# Patient Record
Sex: MALE | Race: WHITE | NOT HISPANIC OR LATINO | Employment: OTHER | URBAN - METROPOLITAN AREA
[De-identification: names, ages, dates, MRNs, and addresses within clinical notes are randomized per-mention and may not be internally consistent; named-entity substitution may affect disease eponyms.]

---

## 2017-01-16 DIAGNOSIS — E78.00 HIGH BLOOD CHOLESTEROL: ICD-10-CM

## 2017-01-16 DIAGNOSIS — I10 ESSENTIAL HYPERTENSION: ICD-10-CM

## 2017-01-16 DIAGNOSIS — I10 BENIGN ESSENTIAL HYPERTENSION: Primary | ICD-10-CM

## 2017-01-17 RX ORDER — LOSARTAN POTASSIUM 100 MG/1
100 TABLET ORAL DAILY
Qty: 90 TABLET | Refills: 1 | Status: SHIPPED | OUTPATIENT
Start: 2017-01-17 | End: 2017-06-19

## 2017-01-17 RX ORDER — ATORVASTATIN CALCIUM 10 MG/1
10 TABLET, FILM COATED ORAL EVERY OTHER DAY
Qty: 45 TABLET | Refills: 1 | Status: SHIPPED | OUTPATIENT
Start: 2017-01-17 | End: 2017-06-19

## 2017-01-17 RX ORDER — HYDROCHLOROTHIAZIDE 12.5 MG/1
12.5 TABLET ORAL DAILY
Qty: 90 TABLET | Refills: 1 | Status: SHIPPED | OUTPATIENT
Start: 2017-01-17 | End: 2017-06-19

## 2017-06-18 ASSESSMENT — ACTIVITIES OF DAILY LIVING (ADL)
ARE_THERE_CARBON_MONOXIDE_DETECTORS_IN_YOUR_HOME?: Y
DO_MEMBERS_OF_YOUR_HOUSEHOLD_USE_SUNSCREEN?: Y
DO_MEMBERS_OF_YOUR_HOUSEHOLD_WEAR_SEAT_BELTS?: Y
DO_MEMBERS_OF_YOUR_HOUSEHOLD_USE_SAFETY_HELMETS?: Y
ARE_THERE_FIREARMS_IN_YOUR_HOME?: Y
ARE_THERE_SMOKE_DETECTORS_IN_YOUR_HOME?: Y

## 2017-06-19 ENCOUNTER — OFFICE VISIT (OUTPATIENT)
Dept: INTERNAL MEDICINE | Facility: CLINIC | Age: 67
End: 2017-06-19

## 2017-06-19 VITALS
WEIGHT: 180.9 LBS | OXYGEN SATURATION: 99 % | DIASTOLIC BLOOD PRESSURE: 86 MMHG | SYSTOLIC BLOOD PRESSURE: 142 MMHG | BODY MASS INDEX: 26.34 KG/M2 | HEART RATE: 60 BPM | RESPIRATION RATE: 18 BRPM | TEMPERATURE: 97.8 F

## 2017-06-19 DIAGNOSIS — I10 BENIGN ESSENTIAL HYPERTENSION: ICD-10-CM

## 2017-06-19 DIAGNOSIS — Z12.5 SCREENING FOR PROSTATE CANCER: ICD-10-CM

## 2017-06-19 DIAGNOSIS — Z11.59 NEED FOR HEPATITIS C SCREENING TEST: Primary | ICD-10-CM

## 2017-06-19 DIAGNOSIS — E78.00 HIGH CHOLESTEROL: ICD-10-CM

## 2017-06-19 DIAGNOSIS — I10 ESSENTIAL HYPERTENSION: ICD-10-CM

## 2017-06-19 DIAGNOSIS — E78.00 HIGH BLOOD CHOLESTEROL: ICD-10-CM

## 2017-06-19 DIAGNOSIS — Z11.59 NEED FOR HEPATITIS C SCREENING TEST: ICD-10-CM

## 2017-06-19 LAB
ALBUMIN SERPL-MCNC: 3.8 G/DL (ref 3.4–5)
ALP SERPL-CCNC: 85 U/L (ref 40–150)
ALT SERPL W P-5'-P-CCNC: 32 U/L (ref 0–70)
ANION GAP SERPL CALCULATED.3IONS-SCNC: 7 MMOL/L (ref 3–14)
AST SERPL W P-5'-P-CCNC: 24 U/L (ref 0–45)
BASOPHILS # BLD AUTO: 0.1 10E9/L (ref 0–0.2)
BASOPHILS NFR BLD AUTO: 0.9 %
BILIRUB SERPL-MCNC: 1.1 MG/DL (ref 0.2–1.3)
BUN SERPL-MCNC: 20 MG/DL (ref 7–30)
CALCIUM SERPL-MCNC: 8.8 MG/DL (ref 8.5–10.1)
CHLORIDE SERPL-SCNC: 103 MMOL/L (ref 94–109)
CHOLEST SERPL-MCNC: 190 MG/DL
CO2 SERPL-SCNC: 27 MMOL/L (ref 20–32)
CREAT SERPL-MCNC: 0.79 MG/DL (ref 0.66–1.25)
DIFFERENTIAL METHOD BLD: NORMAL
EOSINOPHIL # BLD AUTO: 0.2 10E9/L (ref 0–0.7)
EOSINOPHIL NFR BLD AUTO: 3 %
ERYTHROCYTE [DISTWIDTH] IN BLOOD BY AUTOMATED COUNT: 13.4 % (ref 10–15)
GFR SERPL CREATININE-BSD FRML MDRD: NORMAL ML/MIN/1.7M2
GLUCOSE SERPL-MCNC: 85 MG/DL (ref 70–99)
HCT VFR BLD AUTO: 49 % (ref 40–53)
HDLC SERPL-MCNC: 52 MG/DL
HGB BLD-MCNC: 17 G/DL (ref 13.3–17.7)
IMM GRANULOCYTES # BLD: 0 10E9/L (ref 0–0.4)
IMM GRANULOCYTES NFR BLD: 0.4 %
LDLC SERPL CALC-MCNC: 90 MG/DL
LYMPHOCYTES # BLD AUTO: 1.8 10E9/L (ref 0.8–5.3)
LYMPHOCYTES NFR BLD AUTO: 26.1 %
MCH RBC QN AUTO: 31.8 PG (ref 26.5–33)
MCHC RBC AUTO-ENTMCNC: 34.7 G/DL (ref 31.5–36.5)
MCV RBC AUTO: 92 FL (ref 78–100)
MONOCYTES # BLD AUTO: 0.6 10E9/L (ref 0–1.3)
MONOCYTES NFR BLD AUTO: 8.4 %
NEUTROPHILS # BLD AUTO: 4.3 10E9/L (ref 1.6–8.3)
NEUTROPHILS NFR BLD AUTO: 61.2 %
NONHDLC SERPL-MCNC: 137 MG/DL
NRBC # BLD AUTO: 0 10*3/UL
NRBC BLD AUTO-RTO: 0 /100
PLATELET # BLD AUTO: 204 10E9/L (ref 150–450)
POTASSIUM SERPL-SCNC: 3.7 MMOL/L (ref 3.4–5.3)
PROT SERPL-MCNC: 7.3 G/DL (ref 6.8–8.8)
PSA SERPL-ACNC: 2.04 UG/L (ref 0–4)
RBC # BLD AUTO: 5.35 10E12/L (ref 4.4–5.9)
SODIUM SERPL-SCNC: 137 MMOL/L (ref 133–144)
TRIGL SERPL-MCNC: 236 MG/DL
WBC # BLD AUTO: 7 10E9/L (ref 4–11)

## 2017-06-19 RX ORDER — HYDROCHLOROTHIAZIDE 12.5 MG/1
12.5 TABLET ORAL DAILY
Qty: 90 TABLET | Refills: 3 | Status: SHIPPED | OUTPATIENT
Start: 2017-06-19 | End: 2018-05-07

## 2017-06-19 RX ORDER — LOSARTAN POTASSIUM 100 MG/1
100 TABLET ORAL DAILY
Qty: 90 TABLET | Refills: 3 | Status: SHIPPED | OUTPATIENT
Start: 2017-06-19 | End: 2018-05-07

## 2017-06-19 RX ORDER — ATORVASTATIN CALCIUM 10 MG/1
10 TABLET, FILM COATED ORAL EVERY OTHER DAY
Qty: 45 TABLET | Refills: 3 | Status: SHIPPED | OUTPATIENT
Start: 2017-06-19 | End: 2018-05-07

## 2017-06-19 ASSESSMENT — PAIN SCALES - GENERAL: PAINLEVEL: NO PAIN (0)

## 2017-06-19 NOTE — MR AVS SNAPSHOT
After Visit Summary   6/19/2017    Franky Koehler    MRN: [id number]           Patient Information     Date Of Birth          1950        Visit Information        Provider Department      6/19/2017 3:35 PM Fredy Joya MD [facility]        Today's Diagnoses     Need for hepatitis C screening test    -  1    High cholesterol        Screening for prostate cancer        Benign essential hypertension        Essential hypertension        High blood cholesterol          Care Instructions    Primary Care Center Medication Refill Request Information:  * Please contact your pharmacy regarding ANY request for medication refills.  ** [facility] Prescription Fax = [phone]  * Please allow 3 business days for routine medication refills.  * Please allow 5 business days for controlled substance medication refills.     Primary Care Center Test Result notification information:  *You will be notified with in 7-10 days of your appointment day regarding the results of your test.  If you are on MyChart you will be notified as soon as the provider has reviewed the results and signed off on them.    Primary Care Center [phone]           Follow-ups after your visit        Your next 10 appointments already scheduled     Jun 19, 2017  5:00 PM CDT   LAB with  LAB   [facility] ([facility])    [address] [place] [address] [phone]           Patient must bring picture ID.  Patient should be prepared to give a urine specimen  Please do not eat 10-12 hours before your appointment if you are coming in fasting for labs on lipids, cholesterol, or glucose (sugar).  Pregnant women should follow their Care Team instructions. Water with medications is okay. Do not drink coffee or other fluids.   If you have concerns about taking  your medications, please ask at office or if scheduling via [employer], send a message by clicking on Secure Messaging,  Message Your Care Team.            Aug 30, 2017  3:35 PM CDT   (Arrive by 3:20 PM)   Return Visit with Fredy Joya MD   [facility] ([facility] [place])    [address] [id number] [phone]              Future tests that were ordered for you today     Open Future Orders        Priority Expected Expires Ordered    PSA screen Routine 6/19/2017 6/19/2018 6/19/2017    Lipid panel reflex to direct LDL Routine  6/19/2018 6/19/2017    Comprehensive metabolic panel Routine 6/19/2017 6/19/2018 6/19/2017    CBC with platelets differential Routine 6/19/2017 7/3/2017 6/19/2017    Hepatitis C Screen Reflex to HCV RNA Quant and Genotype Routine 6/19/2017 6/19/2018 6/19/2017            Who to contact     Please call your clinic at [phone] to:    Ask questions about your health    Make or cancel appointments    Discuss your medicines    Learn about your test results    Speak to your doctor   If you have compliments or concerns about an experience at your clinic, or if you wish to file a complaint, please contact [facility] Patient Relations at [phone] or email us at [email or web address]         Additional Information About Your Visit        [employer] Information     [employer] gives you secure access to your electronic health record. If you see a primary care provider, you can also send messages to your care team and make appointments. If you have questions, please call your primary care clinic.  If you do not have a primary care provider, please call [phone] and they will assist you.      [employer] is an electronic gateway that provides easy, online access to your medical records. With [employer], you can request a clinic appointment, read your test results, renew a prescription or communicate with your care team.     To access your existing account, please contact your [facility]  Clinic or call [phone] for assistance.        Care EveryWhere ID     This is your Care EveryWhere ID. This could be used by other organizations to access your [place] medical records  [id number]        Your Vitals Were     Pulse Temperature Respirations Pulse Oximetry BMI (Body Mass Index)       60 97.8  F (36.6  C) (Oral) 18 99% 26.34 kg/m2        Blood Pressure from Last 3 Encounters:   06/19/17 142/86   07/28/16 136/89   06/22/16 (!) 170/95    Weight from Last 3 Encounters:   06/19/17 82.1 kg (180 lb 14.4 oz)   07/28/16 80.3 kg (177 lb)   06/22/16 82.7 kg (182 lb 4.8 oz)                 Where to get your medicines      These medications were sent to [facility] - [place], [place] - [address]  [address], [place] [address]     Phone:  [phone]     atorvastatin 10 MG tablet    hydrochlorothiazide 12.5 MG Tabs tablet    losartan 100 MG tablet          Primary Care Provider Office Phone # Fax #    Fredy Joya [phone]       [place] [id number] [address] [address]        Thank you!     Thank you for choosing [facility]  for your care. Our goal is always to provide you with excellent care. Hearing back from our patients is one way we can continue to improve our services. Please take a few minutes to complete the written survey that you may receive in the mail after your visit with us. Thank you!             Your Updated Medication List - Protect others around you: Learn how to safely use, store and throw away your medicines at www.disposemymeds.org.          This list is accurate as of: 6/19/17  4:30 PM.  Always use your most recent med list.                   Brand Name Dispense Instructions for use    atorvastatin 10 MG tablet    LIPITOR    45 tablet    Take 1 tablet (10 mg) by mouth every other day       hydrochlorothiazide 12.5 MG Tabs tablet     90 tablet    Take 1 tablet (12.5 mg) by mouth daily        losartan 100 MG tablet    COZAAR    90 tablet    Take 1 tablet (100 mg) by mouth daily

## 2017-06-19 NOTE — PATIENT INSTRUCTIONS
[facility] Medication Refill Request Information:  * Please contact your pharmacy regarding ANY request for medication refills.  ** [facility] Prescription Fax = [phone]  * Please allow 3 business days for routine medication refills.  * Please allow 5 business days for controlled substance medication refills.     [facility] Test Result notification information:  *You will be notified with in 7-10 days of your appointment day regarding the results of your test.  If you are on MyChart you will be notified as soon as the provider has reviewed the results and signed off on them.    [facility] [phone]

## 2017-06-19 NOTE — NURSING NOTE
Chief Complaint   Patient presents with     Physical     Patient is here for annual physical.      Aniya Senior LPN at 3:48 PM on 6/19/2017.

## 2017-06-20 LAB — HCV AB SERPL QL IA: NORMAL

## 2017-08-30 ENCOUNTER — OFFICE VISIT (OUTPATIENT)
Dept: INTERNAL MEDICINE | Facility: CLINIC | Age: 67
End: 2017-08-30

## 2017-08-30 VITALS — SYSTOLIC BLOOD PRESSURE: 163 MMHG | HEART RATE: 55 BPM | DIASTOLIC BLOOD PRESSURE: 89 MMHG

## 2017-08-30 DIAGNOSIS — I10 BENIGN ESSENTIAL HYPERTENSION: Primary | ICD-10-CM

## 2017-08-30 ASSESSMENT — PAIN SCALES - GENERAL: PAINLEVEL: NO PAIN (0)

## 2017-08-30 NOTE — PROGRESS NOTES
HPI:    Pt. Comes in physical today. He is doing well and denies any new HEENT, cardiopulmonary, abdominal, , neurological, systemic, skin, neurological, lymphatic complaints. He does not smoke nor abuse EtOH. Dinorah. Hx. Father with possible ischemic heart disease. He also had negative exercise stress echo test 8/12. He has no further CP sxs. And is able to exercise without any CP/SOB. He has somewhat elevated BP and states he does consume increased dietary salt. He had normal colonoscopy 11/15/12 repeat in 10 years. He had normal repeat CXR 10/19/12. He had normal labs but elevated fasting lipid panel.  He was started on low dose Lipitor.  He was started on 12.5 mg PO QD of HCTZ.  He took his BP medication today. He states BP at home 130/80 range. He had normal 24 hr BP monitor 6/16 while taking his same medications as normal.   He does not smoke nor abuse EtOH (although he states 2 beers/day).  We reviewed dinorah. Luis. Mother with COPD.  He plans to retire at the end of 2017.    PE:    Vitals noted.   gen nad cooperative alert, HEENT, ears normal oropharynx clear no exudate neck supple nl rom no adenopathy, LCTA, B, normal resp. System exam, RRR, S1, S2, no MRG, abdomen, nt, nd, no masses, grossly normal neurological exam.          A/P:    1. HTN; Labs and renal U/S unremarkable. He will remain on same medications. 24 hr BP monitor done 6/16 as normal and he sew Dr. Murphy in 7/16. He will continue to to check at home. He states he checks this and initially elevated but after waiting drops to 130/80 range.   2. Increased cholesterol on medications  3. Colonoscopy due 11/2017        I will see him back Spring 2018    Total time spent 15 minutes.  More than 50% of the time spent with Mr. Koehler on counseling / coordinating his care

## 2017-08-30 NOTE — NURSING NOTE
Chief Complaint   Patient presents with     Hypertension     Patient here for a blood pressure check.     Earnestine Garrison LPN at 3:40 PM on 8/30/2017.

## 2017-08-30 NOTE — MR AVS SNAPSHOT
After Visit Summary   8/30/2017    Franky Koehler    MRN: [id number]           Patient Information     Date Of Birth          1950        Visit Information        Provider Department      8/30/2017 3:35 PM Fredy Joya MD [facility]        Today's Diagnoses     Benign essential hypertension    -  1       Follow-ups after your visit        Who to contact     Please call your clinic at [phone] to:    Ask questions about your health    Make or cancel appointments    Discuss your medicines    Learn about your test results    Speak to your doctor   If you have compliments or concerns about an experience at your clinic, or if you wish to file a complaint, please contact [facility] Patient Relations at [phone] or email us at [email or web address]         Additional Information About Your Visit        MyChart Information     [employer] gives you secure access to your electronic health record. If you see a primary care provider, you can also send messages to your care team and make appointments. If you have questions, please call your primary care clinic.  If you do not have a primary care provider, please call [phone] and they will assist you.      [employer] is an electronic gateway that provides easy, online access to your medical records. With [employer], you can request a clinic appointment, read your test results, renew a prescription or communicate with your care team.     To access your existing account, please contact your [facility] or call [phone] for assistance.        Care EveryWhere ID     This is your Care EveryWhere ID. This could be used by other organizations to access your [place] medical records  [id number]        Your Vitals Were     Pulse                   55            Blood Pressure from Last 3 Encounters:   08/30/17 163/89   06/19/17 142/86   07/28/16 136/89    Weight from  Last 3 Encounters:   06/19/17 82.1 kg (180 lb 14.4 oz)   07/28/16 80.3 kg (177 lb)   06/22/16 82.7 kg (182 lb 4.8 oz)              Today, you had the following     No orders found for display       Primary Care Provider Office Phone # Fax #    Fredy Joya [phone] [address] [address]        Equal Access to Services     BIBIANA MACARIO : Hadii aad ku hadasho Soomaali, waaxda luqadaha, qaybta kaalmada adeegyada, waxay idiin hayaan adeeg kharash lagennaro . So [facility] [phone].    ATENCIÓN: Si habla español, tiene a merino disposición servicios gratuitos de asistencia lingüística. [facility] [phone].    We comply with applicable federal civil rights laws and [place] laws. We do not discriminate on the basis of race, color, national origin, age, disability sex, sexual orientation or gender identity.            Thank you!     Thank you for choosing [facility]  for your care. Our goal is always to provide you with excellent care. Hearing back from our patients is one way we can continue to improve our services. Please take a few minutes to complete the written survey that you may receive in the mail after your visit with us. Thank you!             Your Updated Medication List - Protect others around you: Learn how to safely use, store and throw away your medicines at www.disposemymeds.org.          This list is accurate as of: 8/30/17  5:05 PM.  Always use your most recent med list.                   Brand Name Dispense Instructions for use Diagnosis    atorvastatin 10 MG tablet    LIPITOR    45 tablet    Take 1 tablet (10 mg) by mouth every other day    High blood cholesterol, Essential hypertension       hydrochlorothiazide 12.5 MG Tabs tablet     90 tablet    Take 1 tablet (12.5 mg) by mouth daily    Benign essential hypertension       losartan 100 MG tablet    COZAAR    90 tablet    Take 1 tablet (100 mg) by mouth daily    Essential hypertension,  High blood cholesterol

## 2018-01-20 ENCOUNTER — HEALTH MAINTENANCE LETTER (OUTPATIENT)
Age: 68
End: 2018-01-20

## 2018-05-04 ASSESSMENT — ENCOUNTER SYMPTOMS
EYE PAIN: 0
EYE REDNESS: 0
EYE WATERING: 0
EYE IRRITATION: 0
DOUBLE VISION: 0

## 2018-05-04 ASSESSMENT — ACTIVITIES OF DAILY LIVING (ADL)
IN_THE_PAST_7_DAYS,_DID_YOU_NEED_HELP_FROM_OTHERS_TO_PERFORM_EVERYDAY_ACTIVITIES_SUCH_AS_EATING,_GETTING_DRESSED,_GROOMING,_BATHING,_WALKING,_OR_USING_THE_TOILET: N
IN_THE_PAST_7_DAYS,_DID_YOU_NEED_HELP_FROM_OTHERS_TO_TAKE_CARE_OF_THINGS_SUCH_AS_LAUNDRY_AND_HOUSEKEEPING,_BANKING,_SHOPPING,_USING_THE_TELEPHONE,_FOOD_PREPARATION,_TRANSPORTATION,_OR_TAKING_YOUR_OWN_MEDICATIONS?: N

## 2018-05-07 ENCOUNTER — TELEPHONE (OUTPATIENT)
Dept: GASTROENTEROLOGY | Facility: CLINIC | Age: 68
End: 2018-05-07

## 2018-05-07 ENCOUNTER — OFFICE VISIT (OUTPATIENT)
Dept: INTERNAL MEDICINE | Facility: CLINIC | Age: 68
End: 2018-05-07
Payer: COMMERCIAL

## 2018-05-07 ENCOUNTER — RESULTS ONLY (OUTPATIENT)
Dept: INTERNAL MEDICINE | Facility: CLINIC | Age: 68
End: 2018-05-07

## 2018-05-07 VITALS
DIASTOLIC BLOOD PRESSURE: 87 MMHG | HEIGHT: 71 IN | HEART RATE: 56 BPM | SYSTOLIC BLOOD PRESSURE: 155 MMHG | OXYGEN SATURATION: 97 % | WEIGHT: 181 LBS | BODY MASS INDEX: 25.34 KG/M2

## 2018-05-07 DIAGNOSIS — I10 BENIGN ESSENTIAL HYPERTENSION: Primary | ICD-10-CM

## 2018-05-07 DIAGNOSIS — I10 ESSENTIAL HYPERTENSION: ICD-10-CM

## 2018-05-07 DIAGNOSIS — Z12.11 SPECIAL SCREENING FOR MALIGNANT NEOPLASMS, COLON: ICD-10-CM

## 2018-05-07 DIAGNOSIS — I10 BENIGN ESSENTIAL HYPERTENSION: ICD-10-CM

## 2018-05-07 DIAGNOSIS — E78.00 HIGH BLOOD CHOLESTEROL: ICD-10-CM

## 2018-05-07 LAB
ALBUMIN SERPL-MCNC: 3.7 G/DL (ref 3.4–5)
ALP SERPL-CCNC: 73 U/L (ref 40–150)
ALT SERPL W P-5'-P-CCNC: 33 U/L (ref 0–70)
ANION GAP SERPL CALCULATED.3IONS-SCNC: 8 MMOL/L (ref 3–14)
AST SERPL W P-5'-P-CCNC: 27 U/L (ref 0–45)
BASOPHILS # BLD AUTO: 0.1 10E9/L (ref 0–0.2)
BASOPHILS NFR BLD AUTO: 1.2 %
BILIRUB SERPL-MCNC: 1.2 MG/DL (ref 0.2–1.3)
BUN SERPL-MCNC: 24 MG/DL (ref 7–30)
CALCIUM SERPL-MCNC: 8.7 MG/DL (ref 8.5–10.1)
CHLORIDE SERPL-SCNC: 109 MMOL/L (ref 94–109)
CO2 SERPL-SCNC: 24 MMOL/L (ref 20–32)
CREAT SERPL-MCNC: 0.84 MG/DL (ref 0.66–1.25)
DIFFERENTIAL METHOD BLD: NORMAL
EOSINOPHIL # BLD AUTO: 0.2 10E9/L (ref 0–0.7)
EOSINOPHIL NFR BLD AUTO: 3.7 %
ERYTHROCYTE [DISTWIDTH] IN BLOOD BY AUTOMATED COUNT: 12.9 % (ref 10–15)
GFR SERPL CREATININE-BSD FRML MDRD: >90 ML/MIN/1.7M2
GLUCOSE SERPL-MCNC: 101 MG/DL (ref 70–99)
HCT VFR BLD AUTO: 46.2 % (ref 40–53)
HGB BLD-MCNC: 15.8 G/DL (ref 13.3–17.7)
IMM GRANULOCYTES # BLD: 0 10E9/L (ref 0–0.4)
IMM GRANULOCYTES NFR BLD: 0.4 %
INTERPRETATION ECG - MUSE: NORMAL
INTERPRETATION ECG - MUSE: NORMAL
LYMPHOCYTES # BLD AUTO: 1.8 10E9/L (ref 0.8–5.3)
LYMPHOCYTES NFR BLD AUTO: 34.6 %
MCH RBC QN AUTO: 31.9 PG (ref 26.5–33)
MCHC RBC AUTO-ENTMCNC: 34.2 G/DL (ref 31.5–36.5)
MCV RBC AUTO: 93 FL (ref 78–100)
MONOCYTES # BLD AUTO: 0.5 10E9/L (ref 0–1.3)
MONOCYTES NFR BLD AUTO: 9.3 %
NEUTROPHILS # BLD AUTO: 2.6 10E9/L (ref 1.6–8.3)
NEUTROPHILS NFR BLD AUTO: 50.8 %
NRBC # BLD AUTO: 0 10*3/UL
NRBC BLD AUTO-RTO: 0 /100
PLATELET # BLD AUTO: 173 10E9/L (ref 150–450)
POTASSIUM SERPL-SCNC: 3.8 MMOL/L (ref 3.4–5.3)
PROT SERPL-MCNC: 6.9 G/DL (ref 6.8–8.8)
RBC # BLD AUTO: 4.96 10E12/L (ref 4.4–5.9)
SODIUM SERPL-SCNC: 140 MMOL/L (ref 133–144)
WBC # BLD AUTO: 5.2 10E9/L (ref 4–11)

## 2018-05-07 RX ORDER — ATORVASTATIN CALCIUM 10 MG/1
10 TABLET, FILM COATED ORAL EVERY OTHER DAY
Qty: 45 TABLET | Refills: 3 | Status: SHIPPED | OUTPATIENT
Start: 2018-05-07 | End: 2019-06-18

## 2018-05-07 RX ORDER — LOSARTAN POTASSIUM AND HYDROCHLOROTHIAZIDE 12.5; 1 MG/1; MG/1
1 TABLET ORAL DAILY
Qty: 90 TABLET | Refills: 3 | Status: SHIPPED | OUTPATIENT
Start: 2018-05-07 | End: 2019-06-18

## 2018-05-07 ASSESSMENT — PAIN SCALES - GENERAL: PAINLEVEL: NO PAIN (0)

## 2018-05-07 NOTE — PROGRESS NOTES
HPI:    Pt. Comes in for preop B cataract surgery today. Several months of vision changes. He is doing well and denies any new HEENT, cardiopulmonary, abdominal, , neurological, systemic, skin, neurological, lymphatic complaints. He does not smoke nor abuse EtOH. Fam. Hx. Father with possible ischemic heart disease. He also had negative exercise stress echo test 8/12.  And is able to exercise without any CP/SOB. He has somewhat elevated BP and states he does consume increased dietary salt. He had normal colonoscopy 11/15/12 repeat in 5 years. He had normal repeat CXR 10/19/12. PSA checked 6/2017. He denies any bleeding or anesthesia issues.     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   gen nad cooperative alert, HEENT, wearing glasses,  ears normal oropharynx clear no exudate neck supple nl rom no adenopathy, LCTA, B, normal resp. System exam, RRR, S1, S2, no MRG, abdomen, nt, nd, no masses, grossly normal neurological exam.      EKG; SR at 51; no acute findings. No change from 8/17/2012    A/P:    1. HTN; Elevated today. Labs and renal U/S unremarkable. He will remain on same medications. 24 hr BP monitor done 6/16 as normal and he saw Dr. Murphy in 7/16.  2. Increased cholesterol on medications; will check liver tests today  3. Colonoscopy ordered future for after cataract surgery  4. He should check with insurance for new Zoster Vaccination  5. Low risk for planned cataract surgery. He can remain on his same medications.   6. He declines any dermatology evaluation for skin check.           Total time spent 25 minutes.  More than 50% of the time spent with Mr. Koehler on counseling / coordinating his care

## 2018-05-07 NOTE — PROGRESS NOTES
Surgeon (please enter first and last name):  Dr ERIN Velazquez  Fax number for Preop Evaluation:  [phone]  Location of Surgery: [facility]   Date of Surgery:  5.15.18 and .22.18  Procedure:  Cataract  History of reaction to anesthesia?  No    Answers for HPI/ROS submitted by the patient on 5/4/2018   General Symptoms: No  Skin Symptoms: No  HENT Symptoms: No  EYE SYMPTOMS: Yes  HEART SYMPTOMS: No  LUNG SYMPTOMS: No  INTESTINAL SYMPTOMS: No  URINARY SYMPTOMS: No  REPRODUCTIVE SYMPTOMS: No  SKELETAL SYMPTOMS: No  BLOOD SYMPTOMS: No  NERVOUS SYSTEM SYMPTOMS: No  MENTAL HEALTH SYMPTOMS: No  Eye pain: No  Vision loss: Yes  Dry eyes: No  Watery eyes: No  Eye bulging: No  Double vision: No  Flashing of lights: No  Spots: No  Floaters: No  Redness: No  Crossed eyes: No  Tunnel Vision: No  Yellowing of eyes: No  Eye irritation: No

## 2018-05-07 NOTE — MR AVS SNAPSHOT
After Visit Summary   5/7/2018    Franky Koehler    MRN: [id number]           Patient Information     Date Of Birth          1950        Visit Information        Provider Department      5/7/2018 8:05 AM Fredy Joya MD [facility]        Today's Diagnoses     Benign essential hypertension    -  1    Special screening for malignant neoplasms, colon        High blood cholesterol        Essential hypertension           Follow-ups after your visit        Additional Services     GASTROENTEROLOGY ADULT REF PROCEDURE ONLY       Last Lab Result: Creatinine (mg/dL)       Date                     Value                 06/19/2017               0.79             ----------  There is no height or weight on file to calculate BMI.     Needed:  No  Language:  English    Patient will be contacted to schedule procedure.     Please be aware that coverage of these services is subject to the terms and limitations of your health insurance plan.  Call member services at your health plan with any benefit or coverage questions.  Any procedures must be performed at a [place] facility OR coordinated by your clinic's referral office.    Please bring the following with you to your appointment:    (1) Any X-Rays, CTs or MRIs which have been performed.  Contact the facility where they were done to arrange for  prior to your scheduled appointment.    (2) List of current medications   (3) This referral request   (4) Any documents/labs given to you for this referral                  Your next 10 appointments already scheduled     May 07, 2018  8:45 AM CDT   LAB with  LAB    [facility] ([facility])    [address] [place] [address] [phone]           Please do not eat 10-12 hours before your appointment if you are coming in fasting for labs on lipids, cholesterol, or glucose (sugar). This does not apply to pregnant women. Water,  "hot tea and black coffee (with nothing added) are okay. Do not drink other fluids, diet soda or chew gum.              Future tests that were ordered for you today     Open Future Orders        Priority Expected Expires Ordered    CBC with platelets differential Routine 5/7/2018 5/21/2018 5/7/2018    Comprehensive metabolic panel Routine 5/7/2018 5/21/2018 5/7/2018            Who to contact     Please call your clinic at [phone] to:    Ask questions about your health    Make or cancel appointments    Discuss your medicines    Learn about your test results    Speak to your doctor            Additional Information About Your Visit        [employer] Information     [employer] gives you secure access to your electronic health record. If you see a primary care provider, you can also send messages to your care team and make appointments. If you have questions, please call your primary care clinic.  If you do not have a primary care provider, please call [phone] and they will assist you.      [employer] is an electronic gateway that provides easy, online access to your medical records. With [employer], you can request a clinic appointment, read your test results, renew a prescription or communicate with your care team.     To access your existing account, please contact your [facility] or call [phone] for assistance.        Care EveryWhere ID     This is your Care EveryWhere ID. This could be used by other organizations to access your [place] medical records  [id number]        Your Vitals Were     Pulse Height Pulse Oximetry BMI (Body Mass Index)          56 1.791 m (5' 10.5\") 97% 25.6 kg/m2         Blood Pressure from Last 3 Encounters:   05/07/18 155/87   08/30/17 163/89   06/19/17 142/86    Weight from Last 3 Encounters:   05/07/18 82.1 kg (181 lb)   06/19/17 82.1 kg (180 lb 14.4 oz)   07/28/16 80.3 kg (177 lb)              We Performed the Following     EKG Performed in Clinic w/ " Provider Reading Fee     GASTROENTEROLOGY ADULT REF PROCEDURE ONLY          Today's Medication Changes          These changes are accurate as of 5/7/18  8:15 AM.  If you have any questions, ask your nurse or doctor.               Start taking these medicines.        Dose/Directions    losartan-hydrochlorothiazide 100-12.5 MG per tablet   Commonly known as:  HYZAAR   Used for:  High blood cholesterol   Started by:  Fredy Joya MD        Dose:  1 tablet   Take 1 tablet by mouth daily   Quantity:  90 tablet   Refills:  3            Where to get your medicines      These medications were sent to [facility] - [place], [place] - [address] [place] [address]  [address] [place] [address], [place] [address]     Phone:  [phone]     atorvastatin 10 MG tablet    losartan-hydrochlorothiazide 100-12.5 MG per tablet                Primary Care Provider Office Phone # Fax #    Fredy Joya [phone]       [address] [address]        Equal Access to Services     KOBI [facility] CenterCHIDI AH: Hadii aad ku hadasho Soomaali, waaxda luqadaha, qaybta kaalmada adeegyada, waxay idiin hayaan adeeg kharaallyson lagennaro . So [facility] [place] [phone].    ATENCIÓN: Si habla español, tiene a merino disposición servicios gratuitos de asistencia lingüística. [facility] [phone].    We comply with applicable federal civil rights laws and [place] laws. We do not discriminate on the basis of race, color, national origin, age, disability, sex, sexual orientation, or gender identity.            Thank you!     Thank you for choosing [facility]  for your care. Our goal is always to provide you with excellent care. Hearing back from our patients is one way we can continue to improve our services. Please take a few minutes to complete the written survey that you may receive in the mail after your visit with us. Thank you!             Your Updated Medication List - Protect others around  you: Learn how to safely use, store and throw away your medicines at www.disposemymeds.org.          This list is accurate as of 5/7/18  8:15 AM.  Always use your most recent med list.                   Brand Name Dispense Instructions for use Diagnosis    atorvastatin 10 MG tablet    LIPITOR    45 tablet    Take 1 tablet (10 mg) by mouth every other day    High blood cholesterol, Essential hypertension       hydrochlorothiazide 12.5 MG Tabs tablet     90 tablet    Take 1 tablet (12.5 mg) by mouth daily    Benign essential hypertension       losartan 100 MG tablet    COZAAR    90 tablet    Take 1 tablet (100 mg) by mouth daily    Essential hypertension, High blood cholesterol       losartan-hydrochlorothiazide 100-12.5 MG per tablet    HYZAAR    90 tablet    Take 1 tablet by mouth daily    High blood cholesterol

## 2018-05-11 ENCOUNTER — TELEPHONE (OUTPATIENT)
Dept: INTERNAL MEDICINE | Facility: CLINIC | Age: 68
End: 2018-05-11

## 2018-05-11 NOTE — TELEPHONE ENCOUNTER
[facility] Call Center    Phone Message    May a detailed message be left on voicemail: no    Reason for Call: Other: Cuca from [facility] [place] calling for a pre-op. Patient had a pre-op done 5/7/18 with Dr. Joya. They need that faxed to a different number. Please fax that pre-op to [phone], directly to [facility].      Action Taken: Message routed to:  [facility] ([facility] Primary Care

## 2018-05-11 NOTE — TELEPHONE ENCOUNTER
Health Call Center    Phone Message    May a detailed message be left on voicemail: yes    Reason for Call: Other: Cuca calling to get clarifications on recent pre-op fax they were sent today     Action Taken: Message routed to:  [facility]      Patient had a pre-op done 5/7/18 with Dr. Joya. Cuca got the fax this morning; however, has questions about missing components to the pre-op results. Items that are missing: medications, allergies, signature, and statement of clearance. Please call Cuca back at: [phone].     Pre-op paper faxed to [facility] [phone].  Mary Monreal RN 12:11 PM on 5/11/2018.

## 2018-05-15 ENCOUNTER — TELEPHONE (OUTPATIENT)
Dept: GASTROENTEROLOGY | Facility: CLINIC | Age: 68
End: 2018-05-15

## 2018-06-21 ENCOUNTER — TELEPHONE (OUTPATIENT)
Dept: INTERNAL MEDICINE | Facility: CLINIC | Age: 68
End: 2018-06-21

## 2018-06-21 NOTE — TELEPHONE ENCOUNTER
[facility] Call Center    Phone Message    May a detailed message be left on voicemail: yes    Reason for Call: Other: Call back     Action Taken: Message routed to:  [facility] Seeking Mahnaz ovalles pcp

## 2018-07-06 NOTE — TELEPHONE ENCOUNTER
Contacted patient regarding his wife becoming a new patient of Dr. Joya's.  Advised him that writer will inform RN of Dr. Joya to address this with him next week.  Sam Lovett LPN at 3:54 PM on 7/6/2018

## 2018-07-06 NOTE — TELEPHONE ENCOUNTER
[facility] Call Center    Phone Message    May a detailed message be left on voicemail: yes    Reason for Call: Other: Pt following up bc he never got a call. Pt wondering if Dr. Joya will see his wife as a new pt.      Action Taken: Message routed to:  [facility] JEANNETTE

## 2018-07-10 NOTE — TELEPHONE ENCOUNTER
[facility] Call Center    Phone Message    May a detailed message be left on voicemail: yes    Reason for Call: Other: Per pt, is still waiting for someone to respond to his request. He stated, that this is his 3rd time calling. Please follow up with patient at your earliest convenience.     Action Taken: Message routed to:  [facility] JEANNETTE

## 2018-11-06 ENCOUNTER — TELEPHONE (OUTPATIENT)
Dept: GASTROENTEROLOGY | Facility: CLINIC | Age: 68
End: 2018-11-06

## 2018-11-06 DIAGNOSIS — Z12.11 SPECIAL SCREENING FOR MALIGNANT NEOPLASMS, COLON: Primary | ICD-10-CM

## 2018-11-06 NOTE — TELEPHONE ENCOUNTER
VM with request pt contact Endoscopy Pre-assessment RN to review upcoming procedure information.  Telephone call-back number provided.  Sherita Villanueva RN  [facility] Endoscopy

## 2018-11-06 NOTE — TELEPHONE ENCOUNTER
Patient scheduled for Colonoscopy    Indication for procedure. Screening    Referring Provider. Mahnaz    ? N/A    Arrival time verified? 9:25 AM    Facility location verified? [address] [facility], 5th floor    Instructions given regarding prep and procedure    Prep Type Golytely    Are you taking any anticoagulants or blood thinners? No    Instructions given? Yes    Electronic implanted devices? No    Pre procedure teaching completed? Yes    Transportation from procedure? Wife    H&P / Pre op physical completed? N/A    Sherita Villanueva RN  [facility] Endoscopy

## 2018-11-12 ENCOUNTER — SURGERY (OUTPATIENT)
Age: 68
End: 2018-11-12

## 2018-11-12 ENCOUNTER — HOSPITAL ENCOUNTER (OUTPATIENT)
Facility: AMBULATORY SURGERY CENTER | Age: 68
End: 2018-11-12
Attending: INTERNAL MEDICINE
Payer: COMMERCIAL

## 2018-11-12 VITALS
BODY MASS INDEX: 25.34 KG/M2 | WEIGHT: 181 LBS | DIASTOLIC BLOOD PRESSURE: 86 MMHG | SYSTOLIC BLOOD PRESSURE: 130 MMHG | RESPIRATION RATE: 16 BRPM | OXYGEN SATURATION: 97 % | TEMPERATURE: 98.2 F | HEIGHT: 71 IN

## 2018-11-12 LAB — COLONOSCOPY: NORMAL

## 2018-11-12 RX ORDER — LIDOCAINE 40 MG/G
CREAM TOPICAL
Status: DISCONTINUED | OUTPATIENT
Start: 2018-11-12 | End: 2018-11-13 | Stop reason: HOSPADM

## 2018-11-12 RX ORDER — SIMETHICONE
LIQUID (ML) MISCELLANEOUS PRN
Status: DISCONTINUED | OUTPATIENT
Start: 2018-11-12 | End: 2018-11-12 | Stop reason: HOSPADM

## 2018-11-12 RX ORDER — FENTANYL CITRATE 50 UG/ML
INJECTION, SOLUTION INTRAMUSCULAR; INTRAVENOUS PRN
Status: DISCONTINUED | OUTPATIENT
Start: 2018-11-12 | End: 2018-11-12 | Stop reason: HOSPADM

## 2018-11-12 RX ORDER — ONDANSETRON 2 MG/ML
4 INJECTION INTRAMUSCULAR; INTRAVENOUS
Status: DISCONTINUED | OUTPATIENT
Start: 2018-11-12 | End: 2018-11-13 | Stop reason: HOSPADM

## 2018-11-12 RX ADMIN — FENTANYL CITRATE 50 MCG: 50 INJECTION, SOLUTION INTRAMUSCULAR; INTRAVENOUS at 10:12

## 2018-11-12 RX ADMIN — FENTANYL CITRATE 50 MCG: 50 INJECTION, SOLUTION INTRAMUSCULAR; INTRAVENOUS at 10:16

## 2018-11-12 RX ADMIN — Medication 2 ML: at 09:57

## 2018-11-12 NOTE — IP AVS SNAPSHOT
MRN:[id number]                      After Visit Summary   11/12/2018    Franky Koehler    MRN: [id number]           Thank you!     Thank you for choosing [facility] for your care. Our goal is always to provide you with excellent care. Hearing back from our patients is one way we can continue to improve our services. Please take a few minutes to complete the written survey that you may receive in the mail after you visit with us. Thank you!        Patient Information     Date Of Birth          1950        About your hospital stay     You were admitted on:  November 12, 2018 [facility]    You were discharged on:  November 12, 2018       Who to Call     For medical emergencies, please call 911.  For non-urgent questions about your medical care, please call your primary care provider or clinic, [phone]  For questions related to your surgery, please call your surgery clinic        Attending Provider     Provider Specialty    Harish Lentz MD Gastroenterology       Primary Care Provider Office Phone # Fax #    Fredy Joya [phone]      Further instructions from your care team       Discharge Instructions after Colonoscopy or Sigmoidoscopy       Today you had a Colonoscopy       Activity and Diet   You were given medicine for pain. You may be dizzy or sleepy.   For 24 hours:     Do not drive or use heavy equipment.     Do not make important decisions.     Do not drink any alcohol.   You may return to your normal diet and medicines.       Discomfort     Air was placed in your colon during the exam in order to see it. Walking helps to pass the air.     You may take Tylenol (acetaminophen) for pain unless your doctor has told you not to.   .       When to call:       Call right away if you have:     Unusual pain in belly or chest pain not relieved with passing air.     More than 1 to 2 Tablespoons of bleeding from  "your rectum.     Fever above 100.6  F (37.5  C).       If you have severe pain, bleeding, or shortness of breath, go to an emergency room.       If you have questions, call:   Monday to Friday, 7 a.m. to 4:30 p.m.   Endoscopy: [phone] (We may have to call you back)       After hours   Hospital: [phone] (Ask for the GI fellow on call)       Pending Results     No orders found from 11/10/2018 to 11/13/2018.            Admission Information     Date & Time Provider Department Dept. Phone    11/12/2018 Harish Lentz MD [facility] [phone]      Your Vitals Were     Blood Pressure Temperature Respirations Height Weight Pulse Oximetry    127/86 98.2  F (36.8  C) 16 1.791 m (5' 10.5\") 82.1 kg (181 lb) 98%    BMI (Body Mass Index)                   25.6 kg/m2           [email or web address] Information     [email or web address] gives you secure access to your electronic health record. If you see a primary care provider, you can also send messages to your care team and make appointments. If you have questions, please call your primary care clinic.  If you do not have a primary care provider, please call [phone] and they will assist you.      [email or web address] is an electronic gateway that provides easy, online access to your medical records. With [email or web address], you can request a clinic appointment, read your test results, renew a prescription or communicate with your care team.     To access your existing account, please contact your [facility] or call [phone] for assistance.        Care EveryWhere ID     This is your Care EveryWhere ID. This could be used by other organizations to access your [place] medical records  [id number]        Equal Access to Services     BIBIANA MACARIO : Gagan Rivera, joel elise, kathie zaidi. So [facility] [phone].    ATENCIÓN: Si habla español, tiene a merino disposición servicios " nan de asistencia lingüística. Kathy connors [phone].    We comply with applicable federal civil rights laws and [place] laws. We do not discriminate on the basis of race, color, national origin, age, disability, sex, sexual orientation, or gender identity.               Review of your medicines      UNREVIEWED medicines. Ask your doctor about these medicines        Dose / Directions    atorvastatin 10 MG tablet   Commonly known as:  LIPITOR   Used for:  High blood cholesterol, Essential hypertension        Dose:  10 mg   Take 1 tablet (10 mg) by mouth every other day   Quantity:  45 tablet   Refills:  3       losartan-hydrochlorothiazide 100-12.5 MG per tablet   Commonly known as:  HYZAAR   Used for:  High blood cholesterol        Dose:  1 tablet   Take 1 tablet by mouth daily   Quantity:  90 tablet   Refills:  3                Protect others around you: Learn how to safely use, store and throw away your medicines at www.disposemymeds.org.             Medication List: This is a list of all your medications and when to take them. Check marks below indicate your daily home schedule. Keep this list as a reference.      Medications           Morning Afternoon Evening Bedtime As Needed    atorvastatin 10 MG tablet   Commonly known as:  LIPITOR   Take 1 tablet (10 mg) by mouth every other day                                losartan-hydrochlorothiazide 100-12.5 MG per tablet   Commonly known as:  HYZAAR   Take 1 tablet by mouth daily

## 2018-11-12 NOTE — IP AVS SNAPSHOT
[facility]    [address] [place] [id number]    Phone:  [phone]    Fax:  [phone]                                       After Visit Summary   11/12/2018    Franky Koehler    MRN: [id number]           After Visit Summary Signature Page     I have received my discharge instructions, and my questions have been answered. I have discussed any challenges I see with this plan with the nurse or doctor.    ..........................................................................................................................................  Patient/Patient Representative Signature      ..........................................................................................................................................  Patient Representative Print Name and Relationship to Patient    ..................................................               ................................................  Date                                   Time    ..........................................................................................................................................  Reviewed by Signature/Title    ...................................................              ..............................................  Date                                               Time          22EPIC Rev 08/18

## 2018-11-12 NOTE — DISCHARGE INSTRUCTIONS
Discharge Instructions after Colonoscopy or Sigmoidoscopy       Today you had a Colonoscopy       Activity and Diet   You were given medicine for pain. You may be dizzy or sleepy.   For 24 hours:     Do not drive or use heavy equipment.     Do not make important decisions.     Do not drink any alcohol.   You may return to your normal diet and medicines.       Discomfort     Air was placed in your colon during the exam in order to see it. Walking helps to pass the air.     You may take Tylenol (acetaminophen) for pain unless your doctor has told you not to.   .       When to call:       Call right away if you have:     Unusual pain in belly or chest pain not relieved with passing air.     More than 1 to 2 Tablespoons of bleeding from your rectum.     Fever above 100.6  F (37.5  C).       If you have severe pain, bleeding, or shortness of breath, go to an emergency room.       If you have questions, call:   Monday to Friday, 7 a.m. to 4:30 p.m.   Endoscopy: [phone] (We may have to call you back)       After hours   Hospital: [phone] (Ask for the GI fellow on call)

## 2019-06-17 ASSESSMENT — ENCOUNTER SYMPTOMS
POSTURAL DYSPNEA: 0
ARTHRALGIAS: 1
NECK PAIN: 0
MUSCLE CRAMPS: 0
SNORES LOUDLY: 1
SHORTNESS OF BREATH: 0
MYALGIAS: 1
BACK PAIN: 0
HEMOPTYSIS: 0
COUGH: 1
MUSCLE WEAKNESS: 0
COUGH DISTURBING SLEEP: 0
JOINT SWELLING: 0
STIFFNESS: 0
SPUTUM PRODUCTION: 1
WHEEZING: 0
DYSPNEA ON EXERTION: 0

## 2019-06-17 ASSESSMENT — ACTIVITIES OF DAILY LIVING (ADL)
IN_THE_PAST_7_DAYS,_DID_YOU_NEED_HELP_FROM_OTHERS_TO_TAKE_CARE_OF_THINGS_SUCH_AS_LAUNDRY_AND_HOUSEKEEPING,_BANKING,_SHOPPING,_USING_THE_TELEPHONE,_FOOD_PREPARATION,_TRANSPORTATION,_OR_TAKING_YOUR_OWN_MEDICATIONS?: N
IN_THE_PAST_7_DAYS,_DID_YOU_NEED_HELP_FROM_OTHERS_TO_PERFORM_EVERYDAY_ACTIVITIES_SUCH_AS_EATING,_GETTING_DRESSED,_GROOMING,_BATHING,_WALKING,_OR_USING_THE_TOILET: N

## 2019-06-18 ENCOUNTER — OFFICE VISIT (OUTPATIENT)
Dept: INTERNAL MEDICINE | Facility: CLINIC | Age: 69
End: 2019-06-18
Payer: COMMERCIAL

## 2019-06-18 VITALS
OXYGEN SATURATION: 97 % | HEART RATE: 55 BPM | BODY MASS INDEX: 26.08 KG/M2 | SYSTOLIC BLOOD PRESSURE: 160 MMHG | HEIGHT: 69 IN | DIASTOLIC BLOOD PRESSURE: 88 MMHG | TEMPERATURE: 98.2 F | WEIGHT: 176.1 LBS

## 2019-06-18 DIAGNOSIS — Z12.5 SCREENING FOR PROSTATE CANCER: Primary | ICD-10-CM

## 2019-06-18 DIAGNOSIS — Z12.5 SCREENING FOR PROSTATE CANCER: ICD-10-CM

## 2019-06-18 DIAGNOSIS — R39.14 FEELING OF INCOMPLETE BLADDER EMPTYING: ICD-10-CM

## 2019-06-18 DIAGNOSIS — I10 BENIGN ESSENTIAL HYPERTENSION: ICD-10-CM

## 2019-06-18 DIAGNOSIS — E78.00 HIGH BLOOD CHOLESTEROL: ICD-10-CM

## 2019-06-18 DIAGNOSIS — I10 ESSENTIAL HYPERTENSION: ICD-10-CM

## 2019-06-18 LAB
ALBUMIN SERPL-MCNC: 3.8 G/DL (ref 3.4–5)
ALP SERPL-CCNC: 88 U/L (ref 40–150)
ALT SERPL W P-5'-P-CCNC: 28 U/L (ref 0–70)
ANION GAP SERPL CALCULATED.3IONS-SCNC: 8 MMOL/L (ref 3–14)
AST SERPL W P-5'-P-CCNC: 24 U/L (ref 0–45)
BASOPHILS # BLD AUTO: 0.1 10E9/L (ref 0–0.2)
BASOPHILS NFR BLD AUTO: 0.9 %
BILIRUB SERPL-MCNC: 1.2 MG/DL (ref 0.2–1.3)
BUN SERPL-MCNC: 23 MG/DL (ref 7–30)
CALCIUM SERPL-MCNC: 9 MG/DL (ref 8.5–10.1)
CHLORIDE SERPL-SCNC: 108 MMOL/L (ref 94–109)
CHOLEST SERPL-MCNC: 207 MG/DL
CO2 SERPL-SCNC: 24 MMOL/L (ref 20–32)
CREAT SERPL-MCNC: 0.76 MG/DL (ref 0.66–1.25)
DIFFERENTIAL METHOD BLD: NORMAL
EOSINOPHIL # BLD AUTO: 0.2 10E9/L (ref 0–0.7)
EOSINOPHIL NFR BLD AUTO: 4.4 %
ERYTHROCYTE [DISTWIDTH] IN BLOOD BY AUTOMATED COUNT: 13.4 % (ref 10–15)
GFR SERPL CREATININE-BSD FRML MDRD: >90 ML/MIN/{1.73_M2}
GLUCOSE SERPL-MCNC: 98 MG/DL (ref 70–99)
HCT VFR BLD AUTO: 48.3 % (ref 40–53)
HDLC SERPL-MCNC: 64 MG/DL
HGB BLD-MCNC: 15.7 G/DL (ref 13.3–17.7)
IMM GRANULOCYTES # BLD: 0 10E9/L (ref 0–0.4)
IMM GRANULOCYTES NFR BLD: 0.4 %
LDLC SERPL CALC-MCNC: 119 MG/DL
LYMPHOCYTES # BLD AUTO: 1.7 10E9/L (ref 0.8–5.3)
LYMPHOCYTES NFR BLD AUTO: 31.9 %
MCH RBC QN AUTO: 30.7 PG (ref 26.5–33)
MCHC RBC AUTO-ENTMCNC: 32.5 G/DL (ref 31.5–36.5)
MCV RBC AUTO: 95 FL (ref 78–100)
MONOCYTES # BLD AUTO: 0.6 10E9/L (ref 0–1.3)
MONOCYTES NFR BLD AUTO: 10.7 %
NEUTROPHILS # BLD AUTO: 2.8 10E9/L (ref 1.6–8.3)
NEUTROPHILS NFR BLD AUTO: 51.7 %
NONHDLC SERPL-MCNC: 143 MG/DL
NRBC # BLD AUTO: 0 10*3/UL
NRBC BLD AUTO-RTO: 0 /100
PLATELET # BLD AUTO: 179 10E9/L (ref 150–450)
POTASSIUM SERPL-SCNC: 4 MMOL/L (ref 3.4–5.3)
PROT SERPL-MCNC: 7 G/DL (ref 6.8–8.8)
PSA SERPL-MCNC: 2.21 UG/L (ref 0–4)
RBC # BLD AUTO: 5.11 10E12/L (ref 4.4–5.9)
SODIUM SERPL-SCNC: 140 MMOL/L (ref 133–144)
TRIGL SERPL-MCNC: 122 MG/DL
WBC # BLD AUTO: 5.4 10E9/L (ref 4–11)

## 2019-06-18 RX ORDER — ATORVASTATIN CALCIUM 10 MG/1
10 TABLET, FILM COATED ORAL EVERY OTHER DAY
Qty: 45 TABLET | Refills: 3 | Status: SHIPPED | OUTPATIENT
Start: 2019-06-18 | End: 2020-08-20

## 2019-06-18 RX ORDER — LOSARTAN POTASSIUM AND HYDROCHLOROTHIAZIDE 12.5; 1 MG/1; MG/1
1 TABLET ORAL DAILY
Qty: 90 TABLET | Refills: 3 | Status: SHIPPED | OUTPATIENT
Start: 2019-06-18 | End: 2020-03-24

## 2019-06-18 ASSESSMENT — PAIN SCALES - GENERAL: PAINLEVEL: NO PAIN (0)

## 2019-06-18 ASSESSMENT — MIFFLIN-ST. JEOR: SCORE: 1573.77

## 2019-06-18 NOTE — PATIENT INSTRUCTIONS
[facility] Medication Refill Request Information:  * Please contact your pharmacy regarding ANY request for medication refills.  ** [facility] Prescription Fax = [phone]  * Please allow 3 business days for routine medication refills.  * Please allow 5 business days for controlled substance medication refills.     [facility] Test Result notification information:  *You will be notified with in 7-10 days of your appointment day regarding the results of your test.  If you are on MyChart you will be notified as soon as the provider has reviewed the results and signed off on them.    [facility]: [phone]

## 2019-06-18 NOTE — PROGRESS NOTES
HPI:    Pt. Comes in for physical today. He is doing well and denies any new HEENT, cardiopulmonary, abdominal, , neurological, systemic, skin, neurological, lymphatic complaints. He does not smoke nor abuse EtOH. Fam. Hx. Father with possible ischemic heart disease. He also had negative exercise stress echo test 8/12.  And is able to exercise without any CP/SOB. He has somewhat elevated BP and states he does consume increased dietary salt. He had normal colonoscopy 11/12/2018 repeat in 10 years. He had normal repeat CXR 10/19/12. PSA checked 5/2018. No inguinal hernias or testicular masses.         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 System exam, RRR, S1, S2, no MRG, abdomen, nt, nd, no masses, grossly normal neurological exam.        A/P:    1. HTN; somewhat elevated  today. Labs and renal U/S unremarkable. He will remain on same medications. 24 hr BP monitor done 6/16 as normal and he saw Dr. Murphy in 7/16.  2. Increased cholesterol on medications; will check liver tests today  3. Colonoscopy done 11/12/2018 repeat in 10 years  4. He should check with insurance for new Zoster Vaccination.   5. He declines any dermatology evaluation for skin check.

## 2019-06-18 NOTE — NURSING NOTE
Chief Complaint   Patient presents with     Physical     pt here for physical       Sasha Ace CMA at 7:27 AM on 6/18/2019.

## 2019-10-03 ENCOUNTER — HEALTH MAINTENANCE LETTER (OUTPATIENT)
Age: 69
End: 2019-10-03

## 2020-03-20 ENCOUNTER — TELEPHONE (OUTPATIENT)
Dept: INTERNAL MEDICINE | Facility: CLINIC | Age: 70
End: 2020-03-20

## 2020-03-20 NOTE — TELEPHONE ENCOUNTER
[facility] Call Center    Phone Message    May a detailed message be left on voicemail: yes     Reason for Call: Medication Question or concern regarding medication   Prescription Clarification  Name of Medication: losartan-hydrochlorothiazide (HYZAAR) 100-12.5 MG tablet   Prescribing Provider: Dr Joya   Pharmacy:    [facility] - [place], [place] - [address] AT [address]     What on the order needs clarification? The medication is on back order. Wants to see if they can dispense medications individually and the Losartan 50 mg and -hydrochlorothiazide 12.5mg          Action Taken: Message routed to:  [facility] primary care    Travel Screening: Not Applicable

## 2020-03-20 NOTE — TELEPHONE ENCOUNTER
[facility] Call Center    Phone Message    May a detailed message be left on voicemail: yes     Reason for Call: Medication Question or concern regarding medication   Prescription Clarification  Name of Medication: losartan-hydrochlorothiazide (HYZAAR) 100-12.5 MG tablet   Prescribing Provider: Dr Joya   Pharmacy: [facility] pharmacy  [id number] [place] [address]    [phone]   What on the order needs clarification? Please send rx to [facility] pharmacy instead!!!  They have this medication in stock.               Action Taken: Message routed to:  [facility]): Primary    Travel Screening: Not Applicable

## 2020-03-23 ENCOUNTER — MYC MEDICAL ADVICE (OUTPATIENT)
Dept: INTERNAL MEDICINE | Facility: CLINIC | Age: 70
End: 2020-03-23

## 2020-03-23 DIAGNOSIS — E78.00 HIGH BLOOD CHOLESTEROL: ICD-10-CM

## 2020-03-23 NOTE — TELEPHONE ENCOUNTER
Spoke to pharmacist Adriana to relay that it was ok to do individual of the medications. For losartan they have 50 mg tablets-will do 2 tablets once a day dispense 180 with 3 refills and Hydrochlorothiazide 12.5, one tablet once a day dispense 90 with 3 refills. Pharmacy will get medication out to the patient.    Earnestine Garrison LPN 3/23/2020 10:58 AM

## 2020-03-24 ENCOUNTER — TELEPHONE (OUTPATIENT)
Dept: INTERNAL MEDICINE | Facility: CLINIC | Age: 70
End: 2020-03-24

## 2020-03-24 DIAGNOSIS — E78.00 HIGH BLOOD CHOLESTEROL: ICD-10-CM

## 2020-03-24 RX ORDER — LOSARTAN POTASSIUM AND HYDROCHLOROTHIAZIDE 12.5; 1 MG/1; MG/1
1 TABLET ORAL DAILY
Qty: 90 TABLET | Refills: 3 | Status: SHIPPED | OUTPATIENT
Start: 2020-03-24 | End: 2020-10-13

## 2020-03-24 RX ORDER — LOSARTAN POTASSIUM AND HYDROCHLOROTHIAZIDE 12.5; 1 MG/1; MG/1
1 TABLET ORAL DAILY
Qty: 90 TABLET | Refills: 3 | OUTPATIENT
Start: 2020-03-24

## 2020-03-24 NOTE — TELEPHONE ENCOUNTER
Prior Authorization Retail Medication Request    Medication/Dose: Losartan/HCTZ 100/12.5mg tablets    Insurance Name:  BCBS Medicare Advantage  Insurance ID:  [id number]      Pharmacy Information (if different than what is on RX)  [facility]  [address] [address]  [phone]

## 2020-03-26 NOTE — TELEPHONE ENCOUNTER
Central Prior Authorization Team   Phone: [phone]    PA Initiation    Medication: losartan-hydrochlorothiazide (HYZAAR) 100-12.5 MG tablet   Insurance Company: [employer] [place] - Phone [phone] Fax [phone]  Pharmacy Filling the Rx: [id number] - [place], [place] - [address]  Filling Pharmacy Phone: [phone]  Filling Pharmacy Fax:    Start Date: 3/26/2020

## 2020-03-26 NOTE — TELEPHONE ENCOUNTER
Prior Authorization Approval (covered at a Tier 6 copay)    Authorization Effective Date:  Not specified  Authorization Expiration Date:  Not specified  Medication: losartan-hydrochlorothiazide (HYZAAR) 100-12.5 MG tablet   Approved Dose/Quantity: 30  Reference #:     Insurance Company: [employer] [place] - Phone [phone] Fax [phone]  Expected CoPay:       CoPay Card Available:      Foundation Assistance Needed:    Which Pharmacy is filling the prescription (Not needed for infusion/clinic administered): [id number] - [place] - [address]  Pharmacy Notified: Yes - via voicemail  Patient Notified:

## 2020-06-29 ENCOUNTER — OFFICE VISIT (OUTPATIENT)
Dept: INTERNAL MEDICINE | Facility: CLINIC | Age: 70
End: 2020-06-29
Payer: COMMERCIAL

## 2020-06-29 ENCOUNTER — TELEPHONE (OUTPATIENT)
Dept: NURSING | Facility: CLINIC | Age: 70
End: 2020-06-29

## 2020-06-29 ENCOUNTER — TELEPHONE (OUTPATIENT)
Dept: INTERNAL MEDICINE | Facility: CLINIC | Age: 70
End: 2020-06-29

## 2020-06-29 ENCOUNTER — ANCILLARY PROCEDURE (OUTPATIENT)
Dept: CT IMAGING | Facility: CLINIC | Age: 70
End: 2020-06-29
Attending: INTERNAL MEDICINE
Payer: COMMERCIAL

## 2020-06-29 VITALS
OXYGEN SATURATION: 98 % | SYSTOLIC BLOOD PRESSURE: 165 MMHG | DIASTOLIC BLOOD PRESSURE: 91 MMHG | HEART RATE: 77 BPM | BODY MASS INDEX: 25.77 KG/M2 | WEIGHT: 176 LBS

## 2020-06-29 DIAGNOSIS — R10.9 FLANK PAIN: ICD-10-CM

## 2020-06-29 DIAGNOSIS — R10.12 LEFT UPPER QUADRANT PAIN: ICD-10-CM

## 2020-06-29 DIAGNOSIS — R10.12 LEFT UPPER QUADRANT PAIN: Primary | ICD-10-CM

## 2020-06-29 LAB
ALBUMIN SERPL-MCNC: 3.8 G/DL (ref 3.4–5)
ALBUMIN UR-MCNC: NEGATIVE MG/DL
ALP SERPL-CCNC: 88 U/L (ref 40–150)
ALT SERPL W P-5'-P-CCNC: 29 U/L (ref 0–70)
ANION GAP SERPL CALCULATED.3IONS-SCNC: 4 MMOL/L (ref 3–14)
APPEARANCE UR: CLEAR
AST SERPL W P-5'-P-CCNC: 22 U/L (ref 0–45)
BASOPHILS # BLD AUTO: 0.1 10E9/L (ref 0–0.2)
BASOPHILS NFR BLD AUTO: 0.9 %
BILIRUB SERPL-MCNC: 0.6 MG/DL (ref 0.2–1.3)
BILIRUB UR QL STRIP: NEGATIVE
BUN SERPL-MCNC: 24 MG/DL (ref 7–30)
CALCIUM SERPL-MCNC: 8.7 MG/DL (ref 8.5–10.1)
CHLORIDE SERPL-SCNC: 108 MMOL/L (ref 94–109)
CO2 SERPL-SCNC: 26 MMOL/L (ref 20–32)
COLOR UR AUTO: YELLOW
CREAT SERPL-MCNC: 0.8 MG/DL (ref 0.66–1.25)
DIFFERENTIAL METHOD BLD: NORMAL
EOSINOPHIL # BLD AUTO: 0.2 10E9/L (ref 0–0.7)
EOSINOPHIL NFR BLD AUTO: 3.6 %
ERYTHROCYTE [DISTWIDTH] IN BLOOD BY AUTOMATED COUNT: 12.4 % (ref 10–15)
GFR SERPL CREATININE-BSD FRML MDRD: >90 ML/MIN/{1.73_M2}
GLUCOSE SERPL-MCNC: 99 MG/DL (ref 70–99)
GLUCOSE UR STRIP-MCNC: NEGATIVE MG/DL
HCT VFR BLD AUTO: 47.7 % (ref 40–53)
HGB BLD-MCNC: 16.4 G/DL (ref 13.3–17.7)
HGB UR QL STRIP: NEGATIVE
IMM GRANULOCYTES # BLD: 0 10E9/L (ref 0–0.4)
IMM GRANULOCYTES NFR BLD: 0.3 %
KETONES UR STRIP-MCNC: NEGATIVE MG/DL
LEUKOCYTE ESTERASE UR QL STRIP: NEGATIVE
LYMPHOCYTES # BLD AUTO: 1.9 10E9/L (ref 0.8–5.3)
LYMPHOCYTES NFR BLD AUTO: 28.6 %
MCH RBC QN AUTO: 31.5 PG (ref 26.5–33)
MCHC RBC AUTO-ENTMCNC: 34.4 G/DL (ref 31.5–36.5)
MCV RBC AUTO: 92 FL (ref 78–100)
MONOCYTES # BLD AUTO: 0.6 10E9/L (ref 0–1.3)
MONOCYTES NFR BLD AUTO: 8.5 %
MUCOUS THREADS #/AREA URNS LPF: PRESENT /LPF
NEUTROPHILS # BLD AUTO: 3.8 10E9/L (ref 1.6–8.3)
NEUTROPHILS NFR BLD AUTO: 58.1 %
NITRATE UR QL: NEGATIVE
NRBC # BLD AUTO: 0 10*3/UL
NRBC BLD AUTO-RTO: 0 /100
PH UR STRIP: 6 PH (ref 5–7)
PLATELET # BLD AUTO: 191 10E9/L (ref 150–450)
POTASSIUM SERPL-SCNC: 3.7 MMOL/L (ref 3.4–5.3)
PROT SERPL-MCNC: 7.3 G/DL (ref 6.8–8.8)
RBC # BLD AUTO: 5.21 10E12/L (ref 4.4–5.9)
RBC #/AREA URNS AUTO: 1 /HPF (ref 0–2)
SODIUM SERPL-SCNC: 138 MMOL/L (ref 133–144)
SOURCE: ABNORMAL
SP GR UR STRIP: 1.02 (ref 1–1.03)
UROBILINOGEN UR STRIP-MCNC: 0 MG/DL (ref 0–2)
WBC # BLD AUTO: 6.5 10E9/L (ref 4–11)
WBC #/AREA URNS AUTO: <1 /HPF (ref 0–5)

## 2020-06-29 RX ORDER — HYDROCHLOROTHIAZIDE 12.5 MG/1
TABLET ORAL
COMMUNITY
Start: 2020-03-27 | End: 2021-03-23

## 2020-06-29 RX ORDER — HYDROCODONE BITARTRATE AND ACETAMINOPHEN 5; 325 MG/1; MG/1
1 TABLET ORAL EVERY 6 HOURS PRN
Qty: 10 TABLET | Refills: 0 | Status: SHIPPED | OUTPATIENT
Start: 2020-06-29 | End: 2020-07-02

## 2020-06-29 ASSESSMENT — PAIN SCALES - GENERAL: PAINLEVEL: MODERATE PAIN (4)

## 2020-06-29 NOTE — PROGRESS NOTES
Mr. Koehler is a 69 year old male here for abdomina pain.     History of Present Illness:    Patient reports abdominal pain for last week. Started while camping.  It's constant, sharp.  4/10 now, up to 8/10.  Worse with laying down. No trauma, straining, lifting. Not pleuritic. Located on L side. Not tender to palpation, no rash.  NO pain eating/drinking.  NO diarrhea, no constipation.  Mild nausea.  No vomiting.  No dysuria. Taking ibuprofen and ASA.    A full 10-pt Review of Systems was performed, verified and is negative except as documented in the HPI.  All health questionnaires were reviewed, verified and relevant information documented above.      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         Allergies:  Reviewed, refer to EMR    Relevant Social History:      Physical Exam:  Vitals: BP (!) 165/91   Pulse 77   Wt 79.8 kg (176 lb)   SpO2 98%   BMI 25.77 kg/m    Constitutional: Alert, oriented, pleasant, no acute distress  Head: Normocephalic, atraumatic  Eyes: Extra-ocular movements intact, pupils equally round and reactive bilaterally, no scleral icterus  ENT: Oropharynx clear, moist mucus membranes, good dentition  Neck: Supple, no lymphadenopathy  Cardiovascular: Regular rate and rhythm, no murmurs, rubs or gallops, peripheral pulses full/symmetric  Respiratory: Good air movement bilaterally, lungs clear, no wheezes/rales/rhonchi  GI: Abdomen soft, bowel sounds present, nondistended, tender with only very deep palpation in LUQ, no pain with rib squeeze, no organomegaly or masses, no rebound/guarding  Musculoskeletal: No edema,  normal muscle tone, normal gait  Neurologic: Alert and oriented, cranial nerves 2-12 intact, strength 5/5 throughout  Skin: No rashes/lesions  Psychiatric: normal mentation, affect and mood        Assessment and Plan:  Franky was seen today for abdominal pain.  Suspicious for nephrolithiasis. May also consider msk etiology, ?spleen. Given CT unremarkable, advised trial of rest, ice, APAP. Advised to RTC if symptoms do not improve.    Diagnoses and all orders for this visit:    Left upper quadrant pain  -     CBC with platelets differential; Future  -     Comprehensive metabolic panel; Future  -     UA with Micro reflex to Culture; Future  -     CT Abdomen/Pelvis w/o contrast; Future  -     HYDROcodone-acetaminophen (NORCO) 5-325 MG tablet; Take 1 tablet by mouth every 6 hours as needed for severe pain    Flank pain  -     CBC with platelets differential; Future  -     Comprehensive metabolic panel; Future  -     UA with Micro reflex to Culture; Future  -     CT Abdomen/Pelvis w/o contrast; Future        Return to clinic: prn if symptoms don't improve    Joy Singh MD  Internal Medicine

## 2020-06-29 NOTE — TELEPHONE ENCOUNTER
Pt called and has had abd x1 week. Nausea no vomiting. No fever, Eating ok. No cough or cold symptons .No diarrhea or constipation.. Abd pain varies. Pain is a 6/10. Requesting to be seen,  Mary Monreal RN 1:27 PM on 6/29/2020.    [facility]: Nurse Triage Note  SITUATION/BACKGROUND                                                      Franky Koehler is a 69 year old male who calls with concerns of left upper abdominal pain x 1 week, along with intermittent nausea.   Patient reports that pain initially was intermittent and has now become constant. Pain varies, when up and about it is about 2/10. Lying or sitting 8/10. Interferes with sleeping.   When asked if pain is worse with cough, responded - yes.   Denies any other symptoms at this time.     Home care instructions reviewed with patient.     Contacted Primary Care and requested an in - office visit. Per , patient will receive a call back after consulting with nursing staff.     RECOMMENDATION/PLAN                                                      RECOMMENDED DISPOSITION:  Seek medical care with 4 hrs due to worsening pain with cough. Patient to seek ED care with persistent vomiting; severe persistent pain; fainting/light- headedness; bloody or black stools or emesis  Will comply with recommendation: Yes    If further questions/concerns or if symptoms do not improve, worsen or new symptoms develop, call your PCP or [phone] to talk with the Resident on call, as soon as possible.    Guideline used: abdominal pain   Telephone Triage Protocols for Nurses, Fifth Edition, Lexie Mcclure, RN, RN

## 2020-06-29 NOTE — TELEPHONE ENCOUNTER
[facility] Call Center    Phone Message    May a detailed message be left on voicemail: yes , Patient is wanting to get a call back     Reason for Call: Symptoms or Concerns     If patient has red-flag symptoms, warm transfer to triage line    Current symptom or concern: Per Patient is wanting to get a call back in regards to getting in office apt, Patient has persistant abdominal pain and gets worse with cough. Patient also has nausea also.     Symptoms have been present for:  1 week(s)    Has patient previously been seen for this? No    By Mahnaz    Date: 6/29/2020    Are there any new or worsening symptoms? Yes: not getting better      Action Taken: Message routed to:  [facility]    Travel Screening: Not Applicable

## 2020-06-29 NOTE — NURSING NOTE
Chief Complaint   Patient presents with     Abdominal Pain     pain started 1 week ago. Pain is constant. Pain is worse laying down, best standing up. Left side abdomen. Normal BM and urination     Kimberly Nissen, EMT at 2:57 PM on 6/29/2020

## 2020-06-29 NOTE — TELEPHONE ENCOUNTER
Duplicate message. Patient was sent to red flag triage. Earnestine Garrison LPN 6/29/2020 9:58 AM

## 2020-07-06 ENCOUNTER — TELEPHONE (OUTPATIENT)
Dept: INTERNAL MEDICINE | Facility: CLINIC | Age: 70
End: 2020-07-06

## 2020-07-06 NOTE — TELEPHONE ENCOUNTER
Can you offer him appt tomorrow with Heaven or Thien to reevaluate?.  Thanks,  Joy Singh MD  Internal Medicine

## 2020-07-06 NOTE — TELEPHONE ENCOUNTER
[facility] Call Center    Phone Message    May a detailed message be left on voicemail: no     Reason for Call: Other: Pt was seen 6/29 by Dr. Singh for abdominal pain, CT scan done same day. Pt reports a bulge that is level with his navel but to the side and increased abdominal pain. Pt wants to know what to do; he suspects a hernia. Please call Pt back.    Action Taken: Message routed to:  [facility]    Travel Screening: Not Applicable

## 2020-07-07 ENCOUNTER — OFFICE VISIT (OUTPATIENT)
Dept: INTERNAL MEDICINE | Facility: CLINIC | Age: 70
End: 2020-07-07
Payer: COMMERCIAL

## 2020-07-07 ENCOUNTER — ANCILLARY PROCEDURE (OUTPATIENT)
Dept: CT IMAGING | Facility: CLINIC | Age: 70
End: 2020-07-07
Attending: INTERNAL MEDICINE
Payer: COMMERCIAL

## 2020-07-07 VITALS
HEART RATE: 54 BPM | OXYGEN SATURATION: 98 % | DIASTOLIC BLOOD PRESSURE: 86 MMHG | BODY MASS INDEX: 26.12 KG/M2 | WEIGHT: 178.4 LBS | SYSTOLIC BLOOD PRESSURE: 142 MMHG

## 2020-07-07 DIAGNOSIS — R10.12 ABDOMINAL PAIN, LEFT UPPER QUADRANT: ICD-10-CM

## 2020-07-07 DIAGNOSIS — R10.12 ABDOMINAL PAIN, LEFT UPPER QUADRANT: Primary | ICD-10-CM

## 2020-07-07 LAB
ALBUMIN SERPL-MCNC: 3.6 G/DL (ref 3.4–5)
ALBUMIN UR-MCNC: NEGATIVE MG/DL
ALP SERPL-CCNC: 81 U/L (ref 40–150)
ALT SERPL W P-5'-P-CCNC: 29 U/L (ref 0–70)
AMYLASE SERPL-CCNC: 62 U/L (ref 30–110)
ANION GAP SERPL CALCULATED.3IONS-SCNC: 4 MMOL/L (ref 3–14)
APPEARANCE UR: CLEAR
AST SERPL W P-5'-P-CCNC: 26 U/L (ref 0–45)
BASOPHILS # BLD AUTO: 0.1 10E9/L (ref 0–0.2)
BASOPHILS NFR BLD AUTO: 1.2 %
BILIRUB SERPL-MCNC: 0.7 MG/DL (ref 0.2–1.3)
BILIRUB UR QL STRIP: NEGATIVE
BUN SERPL-MCNC: 29 MG/DL (ref 7–30)
CALCIUM SERPL-MCNC: 8.7 MG/DL (ref 8.5–10.1)
CHLORIDE SERPL-SCNC: 111 MMOL/L (ref 94–109)
CK SERPL-CCNC: 199 U/L (ref 30–300)
CO2 SERPL-SCNC: 26 MMOL/L (ref 20–32)
COLOR UR AUTO: YELLOW
CREAT SERPL-MCNC: 0.97 MG/DL (ref 0.66–1.25)
CRP SERPL-MCNC: 5.3 MG/L (ref 0–8)
DIFFERENTIAL METHOD BLD: NORMAL
EOSINOPHIL # BLD AUTO: 0.3 10E9/L (ref 0–0.7)
EOSINOPHIL NFR BLD AUTO: 5 %
ERYTHROCYTE [DISTWIDTH] IN BLOOD BY AUTOMATED COUNT: 13 % (ref 10–15)
ERYTHROCYTE [SEDIMENTATION RATE] IN BLOOD BY WESTERGREN METHOD: 6 MM/H (ref 0–20)
GFR SERPL CREATININE-BSD FRML MDRD: 80 ML/MIN/{1.73_M2}
GLUCOSE SERPL-MCNC: 84 MG/DL (ref 70–99)
GLUCOSE UR STRIP-MCNC: NEGATIVE MG/DL
HCT VFR BLD AUTO: 45.7 % (ref 40–53)
HGB BLD-MCNC: 15.4 G/DL (ref 13.3–17.7)
HGB UR QL STRIP: NEGATIVE
IMM GRANULOCYTES # BLD: 0 10E9/L (ref 0–0.4)
IMM GRANULOCYTES NFR BLD: 0.3 %
INTERPRETATION ECG - MUSE: NORMAL
KETONES UR STRIP-MCNC: NEGATIVE MG/DL
LEUKOCYTE ESTERASE UR QL STRIP: NEGATIVE
LIPASE SERPL-CCNC: 91 U/L (ref 73–393)
LYMPHOCYTES # BLD AUTO: 1.8 10E9/L (ref 0.8–5.3)
LYMPHOCYTES NFR BLD AUTO: 30.7 %
MCH RBC QN AUTO: 31.6 PG (ref 26.5–33)
MCHC RBC AUTO-ENTMCNC: 33.7 G/DL (ref 31.5–36.5)
MCV RBC AUTO: 94 FL (ref 78–100)
MONOCYTES # BLD AUTO: 0.6 10E9/L (ref 0–1.3)
MONOCYTES NFR BLD AUTO: 10.4 %
MUCOUS THREADS #/AREA URNS LPF: PRESENT /LPF
NEUTROPHILS # BLD AUTO: 3.1 10E9/L (ref 1.6–8.3)
NEUTROPHILS NFR BLD AUTO: 52.4 %
NITRATE UR QL: NEGATIVE
NRBC # BLD AUTO: 0 10*3/UL
NRBC BLD AUTO-RTO: 0 /100
PH UR STRIP: 5 PH (ref 5–7)
PLATELET # BLD AUTO: 176 10E9/L (ref 150–450)
POTASSIUM SERPL-SCNC: 4 MMOL/L (ref 3.4–5.3)
PROT SERPL-MCNC: 7.1 G/DL (ref 6.8–8.8)
RBC # BLD AUTO: 4.87 10E12/L (ref 4.4–5.9)
RBC #/AREA URNS AUTO: <1 /HPF (ref 0–2)
SODIUM SERPL-SCNC: 140 MMOL/L (ref 133–144)
SOURCE: ABNORMAL
SP GR UR STRIP: 1.02 (ref 1–1.03)
TROPONIN I SERPL-MCNC: <0.015 UG/L (ref 0–0.04)
UROBILINOGEN UR STRIP-MCNC: 0 MG/DL (ref 0–2)
WBC # BLD AUTO: 5.8 10E9/L (ref 4–11)
WBC #/AREA URNS AUTO: 1 /HPF (ref 0–5)

## 2020-07-07 RX ORDER — OXYCODONE AND ACETAMINOPHEN 5; 325 MG/1; MG/1
TABLET ORAL
Qty: 15 TABLET | Refills: 0 | Status: SHIPPED | OUTPATIENT
Start: 2020-07-07 | End: 2020-10-13

## 2020-07-07 RX ORDER — OXYCODONE AND ACETAMINOPHEN 5; 325 MG/1; MG/1
TABLET ORAL
Qty: 15 TABLET | Refills: 0 | Status: SHIPPED | OUTPATIENT
Start: 2020-07-07 | End: 2020-07-07

## 2020-07-07 RX ORDER — IOPAMIDOL 755 MG/ML
109 INJECTION, SOLUTION INTRAVASCULAR ONCE
Status: COMPLETED | OUTPATIENT
Start: 2020-07-07 | End: 2020-07-07

## 2020-07-07 RX ADMIN — IOPAMIDOL 109 ML: 755 INJECTION, SOLUTION INTRAVASCULAR at 12:03

## 2020-07-07 ASSESSMENT — PAIN SCALES - GENERAL: PAINLEVEL: NO PAIN (0)

## 2020-07-07 NOTE — NURSING NOTE
Chief Complaint   Patient presents with     Hernia     pt reports possible hernia, abdominal pain with bulge      DEBRA Kemp at 10:32 AM sign on 7/7/2020

## 2020-07-07 NOTE — PROGRESS NOTES
HPI:    Pt. With h/o HTN and some EtOH use comes in for continued abdominal pain. He was seen 6/29/2020 by Dr. Singh and had unremarkable labs (6/29/2020) and non-contrast abdominal/pelvic CT imagining (6/29/2020). He states the pain is much worse when he lies down. He states when he is upright he is able to function more-or-less. For example, he helped his son cut down a tree yesterday w/o problems except afterwards he was sore in this area and the humidity bothered him. No breathing issues., that is no worse pain with inspiration. No cough. He does not smoke. No preceding viral illness. No F/C/NS. He states possibly smaller size of stool. He had a colonoscopy 11/12/2018 and repeat in 10 years. No urinary complaints. No blood in his stool or dark stools. He seems to be eating OK. No change in his medications. He denies any reflux, heart burn with these sxs. No other HEENT, cardiopulmonary, abdominal, , neurological, systemic, psychiatric, lymphatic, vascular complaints. He was able to drive down her today from [employer]. No previous abdominal surgery. He states Vicodin, NSAIDS, acetaminophen does not reduce his sxs.     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 No obvious HI depression nor diffuse T-wave changes.    A/P:    Lower L chest/upper L  abdominal complaints more in the prone position. Fairly extensive evaluation today negative. Doubt previous PE but could consider Chest CT scan with contrast. Also not quite fits with intraluminal GI disease but could consider EGD?    Total time spent 40 minutes.  More than 50% of the time spent with Mr. Koehler on counseling / coordinating his care

## 2020-07-07 NOTE — TELEPHONE ENCOUNTER
I called this patient and scheduled him for today for further evaluation per Dr. stone recommendation.  Ashley Poole, EMT at 8:51 AM on 7/7/2020.

## 2020-07-18 ENCOUNTER — TELEPHONE (OUTPATIENT)
Dept: INTERNAL MEDICINE | Facility: CLINIC | Age: 70
End: 2020-07-18

## 2020-07-18 DIAGNOSIS — N40.2 PROSTATE NODULE: Primary | ICD-10-CM

## 2020-07-18 NOTE — TELEPHONE ENCOUNTER
Placed orders for prostate MRI and Urology referral this encounter    Dear Franky;     As we discussed on the phone. No acute findings on this CT scan. However, there may be some prostate findings. I recommend we go ahead with a prostate MRI and then you should visit with the Urology doctors. I placed a referral and orders for this today and you can call [phone] to schedule these appointments.    MAMIE Joya MD

## 2020-07-22 ENCOUNTER — PRE VISIT (OUTPATIENT)
Dept: UROLOGY | Facility: CLINIC | Age: 70
End: 2020-07-22

## 2020-07-22 NOTE — TELEPHONE ENCOUNTER
Visit Type : Prostate nodule    Records/Orders: records in epic and MRI is on 8/4     Pt Contacted: no    At Rooming: normal

## 2020-08-03 ENCOUNTER — TELEPHONE (OUTPATIENT)
Dept: INTERNAL MEDICINE | Facility: CLINIC | Age: 70
End: 2020-08-03

## 2020-08-03 NOTE — TELEPHONE ENCOUNTER
[facility] Call Center    Phone Message    May a detailed message be left on voicemail: yes     Reason for Call: Other: Call back: Asmita is calling form [employer]. Asmita is requesting a call back with Dr. Joya's Nurse to discuss having Dr. Joya do a peer to peer review. Please call back to advise. Asmita states she needs a call back as soon as possible.     Action Taken: Message routed to:  [facility]    Travel Screening: Not Applicable

## 2020-08-04 NOTE — TELEPHONE ENCOUNTER
I called and let Asmita know that provider will attempt appeal for the patient. This will be done later this week, patient should cancel his appointment for tomorrow until insurance approves.   Ashley Poole, EMT at 12:44 PM on 8/4/2020.

## 2020-08-04 NOTE — TELEPHONE ENCOUNTER
Procedure MRI for tomorrow has been denied from insurance. Request for peer to peer review sent over [employer]. Will have to pay out of pocket for the imaging of cancel if peer to peer is not completed. Asmita reports that two messages were sent to [employer] via [employer] message sent on 8/3/2020 10AM with information on how to appeal decision. Asmita would like call back on whether provider will attempt to appeal insurance decision.   Ashley Poole, EMT at 11:54 AM on 8/4/2020.

## 2020-08-16 DIAGNOSIS — I10 ESSENTIAL HYPERTENSION: ICD-10-CM

## 2020-08-16 DIAGNOSIS — E78.00 HIGH BLOOD CHOLESTEROL: ICD-10-CM

## 2020-08-19 DIAGNOSIS — I10 ESSENTIAL HYPERTENSION: ICD-10-CM

## 2020-08-19 DIAGNOSIS — E78.00 HIGH BLOOD CHOLESTEROL: ICD-10-CM

## 2020-08-20 RX ORDER — ATORVASTATIN CALCIUM 10 MG/1
TABLET, FILM COATED ORAL
Qty: 45 TABLET | Refills: 3 | OUTPATIENT
Start: 2020-08-20

## 2020-08-20 RX ORDER — ATORVASTATIN CALCIUM 10 MG/1
10 TABLET, FILM COATED ORAL DAILY
Qty: 90 TABLET | Refills: 3 | Status: SHIPPED | OUTPATIENT
Start: 2020-08-20 | End: 2021-08-04

## 2020-08-24 NOTE — TELEPHONE ENCOUNTER
MEDICAL RECORDS REQUEST   [facility]  Urology Clinic  [address] [place] [address]  PHONE: [phone]  Fax: [phone]        FUTURE VISIT INFORMATION                                                   Franky Koehler : 1950 scheduled for future visit at [facility]    APPOINTMENT INFORMATION:    Date: 20 8:30AM    Provider:  Sky Lu PA-C    Reason for Visit/Diagnosis: Prostate Nodule     REFERRAL INFORMATION:    Referring provider:  N/A    Specialty: N/A    Referring providers clinic:  Internal     Clinic contact number:  N/A    RECORDS REQUESTED FOR VISIT                                                     NOTES  STATUS/DETAILS   OFFICE NOTE from referring provider  yes   OFFICE NOTE from other specialist  no   DISCHARGE SUMMARY from hospital  no   DISCHARGE REPORT from the ER  no   OPERATIVE REPORT  no   MEDICATION LIST  no     PRE-VISIT CHECKLIST      Record collection complete Yes- Internal recs in epic  Images in [facility] PACS    Appointment appropriately scheduled           (right time/right provider) Yes   MyChart activation Yes   Questionnaire complete If no, please explain: In proecss      Completed by: Sonali Schneider

## 2020-08-25 ENCOUNTER — OFFICE VISIT (OUTPATIENT)
Dept: UROLOGY | Facility: CLINIC | Age: 70
End: 2020-08-25
Attending: INTERNAL MEDICINE
Payer: COMMERCIAL

## 2020-08-25 ENCOUNTER — PRE VISIT (OUTPATIENT)
Dept: UROLOGY | Facility: CLINIC | Age: 70
End: 2020-08-25

## 2020-08-25 VITALS — BODY MASS INDEX: 25.77 KG/M2 | HEIGHT: 70 IN | WEIGHT: 180 LBS

## 2020-08-25 DIAGNOSIS — N40.2 PROSTATE NODULE: Primary | ICD-10-CM

## 2020-08-25 DIAGNOSIS — N40.2 PROSTATE NODULE: ICD-10-CM

## 2020-08-25 LAB — PSA SERPL-MCNC: 2.9 UG/L (ref 0–4)

## 2020-08-25 RX ORDER — LOSARTAN POTASSIUM 50 MG/1
TABLET ORAL
COMMUNITY
Start: 2020-08-16 | End: 2021-03-23

## 2020-08-25 ASSESSMENT — MIFFLIN-ST. JEOR: SCORE: 1597.72

## 2020-08-25 ASSESSMENT — PAIN SCALES - GENERAL: PAINLEVEL: NO PAIN (0)

## 2020-08-25 NOTE — PATIENT INSTRUCTIONS
UROLOGY CLINIC VISIT PATIENT INSTRUCTIONS    Lab appointment today for PSA blood test.     Follow up for next available cystoscopy with Dr. Medellin, Dr. Castellon, or Dr. Santos.     If you have any issues, questions or concerns in the meantime, do not hesitate to contact us at [phone] or via [employer].     It was a pleasure meeting with you today.  Thank you for allowing me and my team the privilege of caring for you today.  YOU are the reason we are here, and I truly hope we provided you with the excellent service you deserve.  Please let us know if there is anything else we can do for you so that we can be sure you are leaving completely satisfied with your care experience.

## 2020-08-25 NOTE — LETTER
8/25/2020       RE: Franky Koehler  [address]  [facility] [address]     Dear Colleague,    Thank you for referring your patient, Franky Koehler, to the [facility] UROLOGY AND INST FOR PROSTATE AND UROLOGIC CANCERS at [facility]. Please see a copy of my visit note below.      Name: Franky Koehler    MRN: [id number]   YOB: 1950                 Chief Complaint:   Prostate nodule          History of Present Illness:   Mr. Franky Koehler is a very pleasant 69 year old male seen in consultation from Dr. Joya for evaluation of a prostate nodule. Patient had CT chest/abdomen/pelvis with contrast completed on 7/7/2020 due to some left upper quadrant abdominal pain. While the exam was unrevealing for the source of the patient's abdominal pain, there were incidental findings of an enlarged prostate gland (5.2 cm) and an enhancing focus in the lower urinary bladder, possibly representing a protruding prostate nodule vs. debris vs. primary bladder lesion. Prostate MRI was ordered for further characterization but patient's insurance denied coverage so it was not done. He is referred to urology for ongoing evaluation and management.     He denies any bothersome urinary symptoms or gross hematuria. Feels to empty his bladder without straining to void. Has a remote history of a UTI 40 years ago. Denies family history of prostate cancer. No history of kidney stones.     Urinalyses on 7/7/2020 and 6/29/2020 were negative.     His PSA has always been normal (last check on 6/18/19 at 2.21).          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             Past Surgical History:     Past Surgical History:   Procedure Laterality Date     COLONOSCOPY N/A 11/12/2018    Procedure: colonoscopy;  Surgeon: Harish Lentz MD;  Location:  OR     ORTHOPEDIC SURGERY              Social  "History:     Social History     Tobacco Use     Smoking status: Former Smoker     Packs/day: 1.50     Years: 30.00     Pack years: 45.00     Last attempt to quit: 2002     Years since quittin.0     Smokeless tobacco: Never Used   Substance Use Topics     Alcohol use: Yes     Alcohol/week: 0.0 standard drinks     Comment: 12 per week            Family History:   No family history on file.           Allergies:     Allergies   Allergen Reactions     No Known Drug Allergy             Medications:     Current Outpatient Medications   Medication Sig     atorvastatin (LIPITOR) 10 MG tablet Take 1 tablet (10 mg) by mouth daily     hydrochlorothiazide (HYDRODIURIL) 12.5 MG tablet      losartan (COZAAR) 50 MG tablet      losartan-hydrochlorothiazide (HYZAAR) 100-12.5 MG tablet Take 1 tablet by mouth daily     oxyCODONE-acetaminophen (PERCOCET) 5-325 MG tablet Take one or two pills at night for pain     No current facility-administered medications for this visit.              Review of Systems:   Answers for HPI/ROS submitted by the patient on 2020   General Symptoms: No  Skin Symptoms: No  HENT Symptoms: No  EYE SYMPTOMS: No  HEART SYMPTOMS: No  LUNG SYMPTOMS: No  INTESTINAL SYMPTOMS: No  URINARY SYMPTOMS: No  REPRODUCTIVE SYMPTOMS: No  SKELETAL SYMPTOMS: No  BLOOD SYMPTOMS: No  NERVOUS SYSTEM SYMPTOMS: No  MENTAL HEALTH SYMPTOMS: No          Physical Exam:   Ht 1.778 m (5' 10\")   Wt 81.6 kg (180 lb)   BMI 25.83 kg/m    General: age-appropriate appearing male in NAD. Normal body habitus.  HEENT: Head AT/NC, EOMI, CN Grossly intact  Resp: no respiratory distress.  Rectal exam: deferred - see below.  Neuro: grossly intact  Motor: excellent strength throughout  Skin: clear of rashes or ecchymoses.        Labs:      Lab Results   Component Value Date    PSA 2.90 2020    PSA 2.21 2019    PSA 2.04 2017    PSA 1.78 2016    PSA 1.81 2015       Creatinine   Date Value Ref Range Status "   07/07/2020 0.97 0.66 - 1.25 mg/dL Final         Imaging:    All imaging reviewed with patient.    CT CHEST/ABDOMEN/PELVIS W CONTRAST, 7/7/2020  Genitourinary: Both kidneys enhance symmetrically. No solid renal  mass. A small cyst within the midpole of the left kidney in the  superior pole of the right kidney. No hydronephrosis. Mild  circumferential bladder wall thickening is likely due to incomplete  distention or chronic bladder outlet narrowing. Newly visualized  hyperdense/enhancing focus in the lower urinary bladder measuring 9 x  8 mm (series 3, image 559). No significant excretion of intravenous  contrast into the renal collecting systems or ureters is appreciated.  Prostate gland is enlarged measuring 5.2 cm with scattered prostatic  calcification.    IMPRESSION:   1. No CT finding to explain patient's left upper quadrant abdominal  pain.  2. Paraseptal and centrilobular emphysematous changes.  3. Prostatomegaly with poorly visualized hyperdense/enhancing focus  near the bladder neck which likely represents an enhancing BPH nodule  protruding into the bladder. The differential includes hyperdense  debris or primary bladder lesion. Consider further evaluation with  prostate MRI.           Assessment and Plan:   69 year old male with prostatomegaly and an enhancing bladder lesion vs. protruding prostate nodule noted on recent CT with contrast. He is asymptomatic from a urinary standpoint without bothersome LUTS or gross hematuria. PSA has always been normal. Two recent urinalyses were negative for infection or microhematuria. Prostate MRI ordered by PCP for further characterization was denied by his insurance. We therefore discussed further evaluation with PSA, KATHY and possible cystoscopy for direct intravesical examination. He is amenable and wishes to proceed.  -PSA today. KATHY deferred prior to PSA check. Will complete at follow up appointment.   -Cystoscopy with next available urologist.    Additional  recommendations pending results of the above workup.    Thank you for the opportunity to participate in the care of this very pleasant patient. I will keep you updated on his progress.    Tabitha Hagan PA-C  August 25, 2020

## 2020-08-25 NOTE — PROGRESS NOTES
Name: Franky Koehler    MRN: [id number]   YOB: 1950                 Chief Complaint:   Prostate nodule          History of Present Illness:   Mr. Franky Koehler is a very pleasant 69 year old male seen in consultation from Dr. Joya for evaluation of a prostate nodule. Patient had CT chest/abdomen/pelvis with contrast completed on 2020 due to some left upper quadrant abdominal pain. While the exam was unrevealing for the source of the patient's abdominal pain, there were incidental findings of an enlarged prostate gland (5.2 cm) and an enhancing focus in the lower urinary bladder, possibly representing a protruding prostate nodule vs. debris vs. primary bladder lesion. Prostate MRI was ordered for further characterization but patient's insurance denied coverage so it was not done. He is referred to urology for ongoing evaluation and management.     He denies any bothersome urinary symptoms or gross hematuria. Feels to empty his bladder without straining to void. Has a remote history of a UTI 40 years ago. Denies family history of prostate cancer. No history of kidney stones.     Urinalyses on 2020 and 2020 were negative.     His PSA has always been normal (last check on 19 at 2.21).          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             Past Surgical History:     Past Surgical History:   Procedure Laterality Date     COLONOSCOPY N/A 2018    Procedure: colonoscopy;  Surgeon: Harish Lentz MD;  [facility]: [facility] OR     ORTHOPEDIC SURGERY              Social History:     Social History     Tobacco Use     Smoking status: Former Smoker     Packs/day: 1.50     Years: 30.00     Pack years: 45.00     Last attempt to quit: 2002     Years since quittin.0     Smokeless tobacco: Never Used   Substance Use Topics     Alcohol use: Yes     Alcohol/week: 0.0 standard  "drinks     Comment: 12 per week            Family History:   No family history on file.           Allergies:     Allergies   Allergen Reactions     No Known Drug Allergy             Medications:     Current Outpatient Medications   Medication Sig     atorvastatin (LIPITOR) 10 MG tablet Take 1 tablet (10 mg) by mouth daily     hydrochlorothiazide (HYDRODIURIL) 12.5 MG tablet      losartan (COZAAR) 50 MG tablet      losartan-hydrochlorothiazide (HYZAAR) 100-12.5 MG tablet Take 1 tablet by mouth daily     oxyCODONE-acetaminophen (PERCOCET) 5-325 MG tablet Take one or two pills at night for pain     No current facility-administered medications for this visit.              Review of Systems:   Answers for HPI/ROS submitted by the patient on 8/24/2020   General Symptoms: No  Skin Symptoms: No  HENT Symptoms: No  EYE SYMPTOMS: No  HEART SYMPTOMS: No  LUNG SYMPTOMS: No  INTESTINAL SYMPTOMS: No  URINARY SYMPTOMS: No  REPRODUCTIVE SYMPTOMS: No  SKELETAL SYMPTOMS: No  BLOOD SYMPTOMS: No  NERVOUS SYSTEM SYMPTOMS: No  MENTAL HEALTH SYMPTOMS: No          Physical Exam:   Ht 1.778 m (5' 10\")   Wt 81.6 kg (180 lb)   BMI 25.83 kg/m    General: age-appropriate appearing male in NAD. Normal body habitus.  HEENT: Head AT/NC, EOMI, CN Grossly intact  Resp: no respiratory distress.  Rectal exam: deferred - see below.  Neuro: grossly intact  Motor: excellent strength throughout  Skin: clear of rashes or ecchymoses.        Labs:      Lab Results   Component Value Date    PSA 2.90 08/25/2020    PSA 2.21 06/18/2019    PSA 2.04 06/19/2017    PSA 1.78 05/03/2016    PSA 1.81 04/29/2015       Creatinine   Date Value Ref Range Status   07/07/2020 0.97 0.66 - 1.25 mg/dL Final         Imaging:    All imaging reviewed with patient.    CT CHEST/ABDOMEN/PELVIS W CONTRAST, 7/7/2020  Genitourinary: Both kidneys enhance symmetrically. No solid renal  mass. A small cyst within the midpole of the left kidney in the  superior pole of the right kidney. No " hydronephrosis. Mild  circumferential bladder wall thickening is likely due to incomplete  distention or chronic bladder outlet narrowing. Newly visualized  hyperdense/enhancing focus in the lower urinary bladder measuring 9 x  8 mm (series 3, image 559). No significant excretion of intravenous  contrast into the renal collecting systems or ureters is appreciated.  Prostate gland is enlarged measuring 5.2 cm with scattered prostatic  calcification.    IMPRESSION:   1. No CT finding to explain patient's left upper quadrant abdominal  pain.  2. Paraseptal and centrilobular emphysematous changes.  3. Prostatomegaly with poorly visualized hyperdense/enhancing focus  near the bladder neck which likely represents an enhancing BPH nodule  protruding into the bladder. The differential includes hyperdense  debris or primary bladder lesion. Consider further evaluation with  prostate MRI.           Assessment and Plan:   69 year old male with prostatomegaly and an enhancing bladder lesion vs. protruding prostate nodule noted on recent CT with contrast. He is asymptomatic from a urinary standpoint without bothersome LUTS or gross hematuria. PSA has always been normal. Two recent urinalyses were negative for infection or microhematuria. Prostate MRI ordered by PCP for further characterization was denied by his insurance. We therefore discussed further evaluation with PSA, KATHY and possible cystoscopy for direct intravesical examination. He is amenable and wishes to proceed.  -PSA today. KATHY deferred prior to PSA check. Will complete at follow up appointment.   -Cystoscopy with next available urologist.    Additional recommendations pending results of the above workup.    Thank you for the opportunity to participate in the care of this very pleasant patient. I will keep you updated on his progress.    Tabitha Hagan PA-C  August 25, 2020

## 2020-10-06 ENCOUNTER — PRE VISIT (OUTPATIENT)
Dept: UROLOGY | Facility: CLINIC | Age: 70
End: 2020-10-06

## 2020-10-13 ENCOUNTER — OFFICE VISIT (OUTPATIENT)
Dept: UROLOGY | Facility: CLINIC | Age: 70
End: 2020-10-13
Payer: COMMERCIAL

## 2020-10-13 VITALS — DIASTOLIC BLOOD PRESSURE: 87 MMHG | SYSTOLIC BLOOD PRESSURE: 127 MMHG | HEART RATE: 71 BPM

## 2020-10-13 DIAGNOSIS — N40.2 PROSTATE NODULE: Primary | ICD-10-CM

## 2020-10-13 PROCEDURE — 52000 CYSTOURETHROSCOPY: CPT | Performed by: UROLOGY

## 2020-10-13 RX ORDER — LIDOCAINE HYDROCHLORIDE 20 MG/ML
JELLY TOPICAL ONCE
Status: COMPLETED | OUTPATIENT
Start: 2020-10-13 | End: 2020-10-13

## 2020-10-13 RX ADMIN — LIDOCAINE HYDROCHLORIDE: 20 JELLY TOPICAL at 09:54

## 2020-10-13 ASSESSMENT — PAIN SCALES - GENERAL: PAINLEVEL: NO PAIN (0)

## 2020-10-13 NOTE — LETTER
10/13/2020       RE: Franky Koehler  [address]  [facility] [address]     Dear Colleague,    Thank you for referring your patient, Franky Koehler, to the [facility] UROLOGY CLINIC [place] at [facility]. Please see a copy of my visit note below.    CYSTOSCOPY PROCEDURE NOTE    Reason for cystoscopy: Enhancing prostate nodule    Brief History: 68 yo M with hx of enhancing prostate nodule found incidentally on CT for abdominal pain with PMD.  Denies LUTS, no family hx of  malignancy, no hx of hematuria.    CYSTOSCOPY  After obtaining informed consent, the patient was prepped and draped in the standard sterile fashion.  The 15 Citizen of [place] flexible cystoscope was inserted through the urethral meatus.      The anterior urethra was:  normal without stricture.    The external sphincter was  appropriately coapted.   The prostatic urethra demonstrated mild bilobar hypertrophy.    The bladder neck was  nonocclusive.    The bladder was  unremarkable for tumors, erythema or stones.    The ureteral orifices  were identified on each side in orthotopic position with efflux of clear urine.   There were minimal trabeculations.    On retroflexion there was the usual bladder neck hyperemia.    There was no considerable intravesical protrusion of the prostate.      The patient tolerated the procedure well without complication.      AUA SS 3/1    KATHY - 40 gm smooth    Assessment/Plan: 68 yo M with LUTS/BPH, prostate nodule incidentally on CT  -Prostate nodule most likely representative of BPH   -Normal cysto findings otherwise, can f/u with urology prn    IJason saw and evaluated this patient and agree with the plan as stated above.  I personally performed all listed procedures.

## 2020-10-13 NOTE — NURSING NOTE
Invasive Procedure Safety Checklist:    Procedure: Cystoscopy    Action: Complete sections and checkboxes as appropriate.    Pre-procedure:  1. Patient ID Verified with 2 identifiers (Varsha and  or MRN) : YES    2. Procedure and site verified with patient/designee (when able) : YES    3. Accurate consent documentation in medical record : YES    4. H&P (or appropriate assessment) documented in medical record : NO  H&P must be up to 30 days prior to procedure an updated within 24 hours of                 Procedure as applicable.     5. Relevant diagnostic and radiology test results appropriately labeled and displayed as applicable : NO    6. Blood products, implants, devices, and/or special equipment available for the procedure as applicable : NO    7. Procedure site(s) marked with provider initials [Exclusions: ] : NO    8. Marking not required. Reason : Yes  Procedure does not require site marking    Time Out:     Time-Out performed immediately prior to starting procedure, including verbal and active participation of all team members addressing: YES    1. Correct patient identity.  2. Confirmed that the correct side and site are marked.  3. An accurate procedure to be done.  4. Agreement on the procedure to be done.  5. Correct patient position.  6. Relevant images and results are properly labeled and appropriately displayed.  7. The need to administer antibiotics or fluids for irrigation purposes during the procedure as applicable.  8. Safety precautions based on patient history or medication use.    During Procedure: Verification of correct person, site, and procedure occurs any time the responsibility for care of the patient is transferred to another member of the care team.    The following medication was given:     MEDICATION:  [employer]rojet  ROUTE: Urethral  SITE: Urethra  DOSE: 200 mg (20 mg/mL)  LOT #: [id number]  : [employer]  EXPIRATION DATE: 2022  NDC#: [address]-3   Was there drug waste?  No      Angie Del Castillo, [facility]  October 13, 2020

## 2020-10-13 NOTE — PROGRESS NOTES
CYSTOSCOPY PROCEDURE NOTE    Reason for cystoscopy: Enhancing prostate nodule    Brief History: 70 yo M with hx of enhancing prostate nodule found incidentally on CT for abdominal pain with PMD.  Denies LUTS, no family hx of  malignancy, no hx of hematuria.    CYSTOSCOPY  After obtaining informed consent, the patient was prepped and draped in the standard sterile fashion.  The 15 [place] flexible cystoscope was inserted through the urethral meatus.      The anterior urethra was:  normal without stricture.    The external sphincter was  appropriately coapted.   The prostatic urethra demonstrated mild bilobar hypertrophy.    The bladder neck was  nonocclusive.    The bladder was  unremarkable for tumors, erythema or stones.    The ureteral orifices  were identified on each side in orthotopic position with efflux of clear urine.   There were minimal trabeculations.    On retroflexion there was the usual bladder neck hyperemia.    There was no considerable intravesical protrusion of the prostate.      The patient tolerated the procedure well without complication.      AUA SS 3/1    KATHY - 40 gm smooth    Assessment/Plan: 70 yo M with LUTS/BPH, prostate nodule incidentally on CT  -Prostate nodule most likely representative of BPH   -Normal cysto findings otherwise, can f/u with urology prn    IJason saw and evaluated this patient and agree with the plan as stated above.  I personally performed all listed procedures.

## 2020-10-13 NOTE — PATIENT INSTRUCTIONS
Follow up with Dr. Medellin as needed.    It was a pleasure meeting with you today.  Thank you for allowing me and my team the privilege of caring for you today.  YOU are the reason we are here, and I truly hope we provided you with the excellent service you deserve.  Please let us know if there is anything else we can do for you so that we can be sure you are leaving completely satisfied with your care experience.        Angie Del Castillo, CMA

## 2020-10-13 NOTE — NURSING NOTE
Chief Complaint   Patient presents with     Cystoscopy     nodule/enlarged prostate      Angie Del Castillo, CMA

## 2020-11-07 ENCOUNTER — HEALTH MAINTENANCE LETTER (OUTPATIENT)
Age: 70
End: 2020-11-07

## 2021-03-22 ASSESSMENT — ENCOUNTER SYMPTOMS
JOINT SWELLING: 0
MUSCLE CRAMPS: 0
STIFFNESS: 0
NECK PAIN: 1
BACK PAIN: 1
ARTHRALGIAS: 0
MYALGIAS: 0
MUSCLE WEAKNESS: 0

## 2021-03-23 ENCOUNTER — TRANSFERRED RECORDS (OUTPATIENT)
Dept: HEALTH INFORMATION MANAGEMENT | Facility: CLINIC | Age: 71
End: 2021-03-23

## 2021-03-23 ENCOUNTER — OFFICE VISIT (OUTPATIENT)
Dept: INTERNAL MEDICINE | Facility: CLINIC | Age: 71
End: 2021-03-23
Payer: COMMERCIAL

## 2021-03-23 VITALS
HEART RATE: 54 BPM | WEIGHT: 175 LBS | SYSTOLIC BLOOD PRESSURE: 121 MMHG | BODY MASS INDEX: 25.11 KG/M2 | OXYGEN SATURATION: 100 % | DIASTOLIC BLOOD PRESSURE: 93 MMHG

## 2021-03-23 DIAGNOSIS — E78.00 HIGH BLOOD CHOLESTEROL: ICD-10-CM

## 2021-03-23 DIAGNOSIS — I10 BENIGN ESSENTIAL HYPERTENSION: Primary | ICD-10-CM

## 2021-03-23 DIAGNOSIS — N52.8 OTHER MALE ERECTILE DYSFUNCTION: ICD-10-CM

## 2021-03-23 DIAGNOSIS — I10 BENIGN ESSENTIAL HYPERTENSION: ICD-10-CM

## 2021-03-23 LAB
ALBUMIN SERPL-MCNC: 4 G/DL (ref 3.4–5)
ALP SERPL-CCNC: 90 U/L (ref 40–150)
ALT SERPL W P-5'-P-CCNC: 33 U/L (ref 0–70)
ANION GAP SERPL CALCULATED.3IONS-SCNC: 4 MMOL/L (ref 3–14)
AST SERPL W P-5'-P-CCNC: 23 U/L (ref 0–45)
BASOPHILS # BLD AUTO: 0 10E9/L (ref 0–0.2)
BASOPHILS NFR BLD AUTO: 0.6 %
BILIRUB SERPL-MCNC: 1.4 MG/DL (ref 0.2–1.3)
BUN SERPL-MCNC: 17 MG/DL (ref 7–30)
CALCIUM SERPL-MCNC: 9.2 MG/DL (ref 8.5–10.1)
CHLORIDE SERPL-SCNC: 105 MMOL/L (ref 94–109)
CHOLEST SERPL-MCNC: 177 MG/DL
CO2 SERPL-SCNC: 28 MMOL/L (ref 20–32)
CREAT SERPL-MCNC: 0.77 MG/DL (ref 0.66–1.25)
DIFFERENTIAL METHOD BLD: NORMAL
EOSINOPHIL # BLD AUTO: 0.2 10E9/L (ref 0–0.7)
EOSINOPHIL NFR BLD AUTO: 3 %
ERYTHROCYTE [DISTWIDTH] IN BLOOD BY AUTOMATED COUNT: 13.1 % (ref 10–15)
GFR SERPL CREATININE-BSD FRML MDRD: >90 ML/MIN/{1.73_M2}
GLUCOSE SERPL-MCNC: 93 MG/DL (ref 70–99)
HCT VFR BLD AUTO: 48.6 % (ref 40–53)
HDLC SERPL-MCNC: 65 MG/DL
HGB BLD-MCNC: 16.2 G/DL (ref 13.3–17.7)
IMM GRANULOCYTES # BLD: 0 10E9/L (ref 0–0.4)
IMM GRANULOCYTES NFR BLD: 0.3 %
LDLC SERPL CALC-MCNC: 96 MG/DL
LYMPHOCYTES # BLD AUTO: 2 10E9/L (ref 0.8–5.3)
LYMPHOCYTES NFR BLD AUTO: 30.9 %
MCH RBC QN AUTO: 31.4 PG (ref 26.5–33)
MCHC RBC AUTO-ENTMCNC: 33.3 G/DL (ref 31.5–36.5)
MCV RBC AUTO: 94 FL (ref 78–100)
MONOCYTES # BLD AUTO: 0.5 10E9/L (ref 0–1.3)
MONOCYTES NFR BLD AUTO: 7.6 %
NEUTROPHILS # BLD AUTO: 3.8 10E9/L (ref 1.6–8.3)
NEUTROPHILS NFR BLD AUTO: 57.6 %
NONHDLC SERPL-MCNC: 112 MG/DL
NRBC # BLD AUTO: 0 10*3/UL
NRBC BLD AUTO-RTO: 0 /100
PLATELET # BLD AUTO: 213 10E9/L (ref 150–450)
POTASSIUM SERPL-SCNC: 3.8 MMOL/L (ref 3.4–5.3)
PROT SERPL-MCNC: 7.5 G/DL (ref 6.8–8.8)
RBC # BLD AUTO: 5.16 10E12/L (ref 4.4–5.9)
SODIUM SERPL-SCNC: 137 MMOL/L (ref 133–144)
TRIGL SERPL-MCNC: 79 MG/DL
WBC # BLD AUTO: 6.6 10E9/L (ref 4–11)

## 2021-03-23 PROCEDURE — 36415 COLL VENOUS BLD VENIPUNCTURE: CPT | Performed by: PATHOLOGY

## 2021-03-23 PROCEDURE — 80053 COMPREHEN METABOLIC PANEL: CPT | Performed by: PATHOLOGY

## 2021-03-23 PROCEDURE — 85025 COMPLETE CBC W/AUTO DIFF WBC: CPT | Performed by: PATHOLOGY

## 2021-03-23 PROCEDURE — 99397 PER PM REEVAL EST PAT 65+ YR: CPT | Performed by: INTERNAL MEDICINE

## 2021-03-23 PROCEDURE — 80061 LIPID PANEL: CPT | Performed by: PATHOLOGY

## 2021-03-23 RX ORDER — HYDROCHLOROTHIAZIDE 12.5 MG/1
12.5 TABLET ORAL DAILY
Qty: 90 TABLET | Refills: 3 | Status: SHIPPED | OUTPATIENT
Start: 2021-03-23 | End: 2021-08-05

## 2021-03-23 RX ORDER — SILDENAFIL 50 MG/1
50 TABLET, FILM COATED ORAL DAILY PRN
Qty: 10 TABLET | Refills: 4 | Status: SHIPPED | OUTPATIENT
Start: 2021-03-23 | End: 2021-03-23

## 2021-03-23 RX ORDER — LOSARTAN POTASSIUM 50 MG/1
50 TABLET ORAL DAILY
Qty: 90 TABLET | Refills: 3 | Status: SHIPPED | OUTPATIENT
Start: 2021-03-23 | End: 2021-03-29

## 2021-03-23 RX ORDER — SILDENAFIL 50 MG/1
50 TABLET, FILM COATED ORAL DAILY PRN
Qty: 10 TABLET | Refills: 4 | Status: SHIPPED | OUTPATIENT
Start: 2021-03-23 | End: 2022-05-09

## 2021-03-23 NOTE — PROGRESS NOTES
HPI:    Mr. Koehler comes in for a physical today. Last visit with me 7/7/2020. Overall doing well. He continues to get exercise and no complaints. He does not smoke. He has some erectile dysfunction.  No other HEENT, cardiopulmonary, abdominal, , neurological, systemic, psychiatric, lymphatic, endocrine, vascular complaints.     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     Repeat manual BP was 121/93     A/P:    1. Immunizations; He has completed the Pfizer vaccination series   2. Urology visit with Dr. Medellin 10/13/2020 for prostate nodule. PSA was done 8/25/2020 at value 2.90  3. HTN; he states better control at home. I encouraged him check at home and get back to me.   4. Colonoscopy 11/12/2018 and repeat in 10 years  5. Increased cholesterol on atorvastatin and ordered labs today  6. Ordered labs today on hydrochlorothiazide and Losartan  7. Dermatology; he does not feel he needs a skin check at this point  8. Sent in a Rx. For Viagra for ED and if worse recommend Urology evaluation.

## 2021-03-23 NOTE — NURSING NOTE
Chief Complaint   Patient presents with     Physical     Kimberly Nissen, EMT at 1:42 PM on 3/23/2021

## 2021-05-28 ENCOUNTER — RECORDS - HEALTHEAST (OUTPATIENT)
Dept: ADMINISTRATIVE | Facility: CLINIC | Age: 71
End: 2021-05-28

## 2021-08-02 DIAGNOSIS — E78.00 HIGH BLOOD CHOLESTEROL: ICD-10-CM

## 2021-08-02 DIAGNOSIS — I10 ESSENTIAL HYPERTENSION: ICD-10-CM

## 2021-08-04 ENCOUNTER — MYC MEDICAL ADVICE (OUTPATIENT)
Dept: INTERNAL MEDICINE | Facility: CLINIC | Age: 71
End: 2021-08-04

## 2021-08-04 DIAGNOSIS — E78.00 HIGH BLOOD CHOLESTEROL: ICD-10-CM

## 2021-08-04 DIAGNOSIS — I10 BENIGN ESSENTIAL HYPERTENSION: ICD-10-CM

## 2021-08-04 DIAGNOSIS — N52.8 OTHER MALE ERECTILE DYSFUNCTION: ICD-10-CM

## 2021-08-04 RX ORDER — ATORVASTATIN CALCIUM 10 MG/1
10 TABLET, FILM COATED ORAL DAILY
Qty: 90 TABLET | Refills: 2 | Status: SHIPPED | OUTPATIENT
Start: 2021-08-04 | End: 2022-07-01

## 2021-08-05 RX ORDER — HYDROCHLOROTHIAZIDE 12.5 MG/1
12.5 TABLET ORAL DAILY
Qty: 90 TABLET | Refills: 2 | Status: SHIPPED | OUTPATIENT
Start: 2021-08-05 | End: 2022-07-01

## 2021-08-05 RX ORDER — LOSARTAN POTASSIUM 50 MG/1
100 TABLET ORAL DAILY
Qty: 180 TABLET | Refills: 2 | Status: SHIPPED | OUTPATIENT
Start: 2021-08-05 | End: 2022-12-02

## 2021-08-05 NOTE — TELEPHONE ENCOUNTER
I I tried to refill my prescriptions through [facility], and the Atorvastatin showed 0 refills. I m pretty sure that when I saw you this spring, you renewed all my prescriptions, but when I called, they said they had no record of it. They said they reached out to your office, but got no response (they always say that). They still showed 2 refills on the Losartan and Hydrochlorothiazide, so I was able to refill those. Anyway, could your office reach out to them so that they ll send the Atorvastatin, and maybe add refills to the other 2 so that I don t have to bug you again before I see you next spring?    atorvastatin (LIPITOR) 10 MG tablet  Last Written Prescription Date:  8/4/21  Last Fill Quantity: 90,   # refills: 2    hydrochlorothiazide (HYDRODIURIL) 12.5 MG tablet   Last Written Prescription Date:  3/23/21  Last Fill Quantity: 90,   # refills: 3  losartan (COZAAR) 50 MG tablet     Last Written Prescription Date:  3/29/21  Last Fill Quantity: 180,   # refills: 3  Last Office Visit : 3/23/21  Future Office visit: Annual    Resent as fulfillment order  Atorvastatin sent 8/4/21 to [facility]

## 2021-09-05 ENCOUNTER — HEALTH MAINTENANCE LETTER (OUTPATIENT)
Age: 71
End: 2021-09-05

## 2022-05-03 ENCOUNTER — OFFICE VISIT (OUTPATIENT)
Dept: OPTOMETRY | Facility: CLINIC | Age: 72
End: 2022-05-03
Payer: COMMERCIAL

## 2022-05-03 DIAGNOSIS — Z96.1 PSEUDOPHAKIA OF BOTH EYES: Primary | ICD-10-CM

## 2022-05-03 DIAGNOSIS — H52.4 PRESBYOPIA: ICD-10-CM

## 2022-05-03 PROCEDURE — 92004 COMPRE OPH EXAM NEW PT 1/>: CPT | Performed by: OPTOMETRIST

## 2022-05-03 PROCEDURE — 92015 DETERMINE REFRACTIVE STATE: CPT | Performed by: OPTOMETRIST

## 2022-05-03 ASSESSMENT — KERATOMETRY
OD_K1POWER_DIOPTERS: 42.75
OS_K1POWER_DIOPTERS: 43.00
OD_K2POWER_DIOPTERS: 42.00
OS_AXISANGLE2_DEGREES: 38
OS_K2POWER_DIOPTERS: 42.25
OD_AXISANGLE2_DEGREES: 24

## 2022-05-03 ASSESSMENT — CONF VISUAL FIELD
OS_NORMAL: 1
METHOD: COUNTING FINGERS
OD_NORMAL: 1

## 2022-05-03 ASSESSMENT — REFRACTION_MANIFEST
OS_ADD: +2.00
OS_SPHERE: -0.50
METHOD_AUTOREFRACTION: 1
OD_AXIS: 030
OD_CYLINDER: +0.50
OS_CYLINDER: SPHERE
OS_AXIS: 015
OS_SPHERE: -1.00
OD_ADD: +2.00
OS_CYLINDER: +0.50
OD_SPHERE: -0.50
OD_CYLINDER: +1.25
OD_AXIS: 020
OD_SPHERE: -0.50

## 2022-05-03 ASSESSMENT — SLIT LAMP EXAM - LIDS
COMMENTS: NORMAL
COMMENTS: NORMAL

## 2022-05-03 ASSESSMENT — VISUAL ACUITY
METHOD: SNELLEN - LINEAR
OD_SC: 20/40
OD_PH_SC: 20/20
OD_PH_SC+: -1
OS_SC+: -1
OS_SC: 20/30
OD_SC: 20/100-1
OS_SC: 20/70-2

## 2022-05-03 ASSESSMENT — TONOMETRY
OD_IOP_MMHG: 17
OS_IOP_MMHG: 17
IOP_METHOD: APPLANATION

## 2022-05-03 ASSESSMENT — CUP TO DISC RATIO
OS_RATIO: 0.3
OD_RATIO: 0.3

## 2022-05-03 NOTE — PATIENT INSTRUCTIONS
Reading glasses advised +1.75   Consider glasses for driving if bothered when driving at night     Return in 1 year for eye exam    Linnette Vaughan, OD  [phone]

## 2022-05-03 NOTE — LETTER
5/3/2022         RE: Franky Koehler  [address] [facility] [address]        Dear Colleague,    Thank you for referring your patient, Franky Koehler, to the [facility]. Please see a copy of my visit note below.    Chief Complaint   Patient presents with     COMPREHENSIVE EYE EXAM      Accompanied by wife, she is the next appointment     Had cataracts removed in 2018 by Dr Fuentes    Last Eye Exam: 1 year ago   Dilated Previously: Yes    What are you currently using to see?  Readers, uses +1.25's. He did not bring them to this appointment        Distance Vision Acuity: Satisfied with vision for driving     Near Vision Acuity: Satisfied with vision while reading and on the computer with readers and unaided at times     Eye Comfort: good  Do you use eye drops? : Not usually   Occupation or Hobbies: Retired     [employer] Assistant           Medical, surgical and family histories reviewed and updated 5/3/2022.       OBJECTIVE: See Ophthalmology exam    ASSESSMENT:    ICD-10-CM    1. Pseudophakia of both eyes  Z96.1 EYE EXAM (SIMPLE-NONBILLABLE)     REFRACTION   2. Presbyopia  H52.4 EYE EXAM (SIMPLE-NONBILLABLE)     REFRACTION       PLAN:     Patient Instructions   Reading glasses advised +1.75   Consider glasses for driving if bothered when driving at night     Return in 1 year for eye exam    Linnette Vaughan, OD  [phone]                      Again, thank you for allowing me to participate in the care of your patient.        Sincerely,        Linnette Vaughan, OD

## 2022-05-03 NOTE — PROGRESS NOTES
Chief Complaint   Patient presents with     COMPREHENSIVE EYE EXAM      Accompanied by wife, she is the next appointment     Had cataracts removed in 2018 by Dr Fuentes    Last Eye Exam: 1 year ago   Dilated Previously: Yes    What are you currently using to see?  Readers, uses +1.25's. He did not bring them to this appointment        Distance Vision Acuity: Satisfied with vision for driving     Near Vision Acuity: Satisfied with vision while reading and on the computer with readers and unaided at times     Eye Comfort: good  Do you use eye drops? : Not usually   Occupation or Hobbies: Retired     [employer] Assistant           Medical, surgical and family histories reviewed and updated 5/3/2022.       OBJECTIVE: See Ophthalmology exam    ASSESSMENT:    ICD-10-CM    1. Pseudophakia of both eyes  Z96.1 EYE EXAM (SIMPLE-NONBILLABLE)     REFRACTION   2. Presbyopia  H52.4 EYE EXAM (SIMPLE-NONBILLABLE)     REFRACTION       PLAN:     Patient Instructions   Reading glasses advised +1.75   Consider glasses for driving if bothered when driving at night     Return in 1 year for eye exam    Linnette Vaughan, OD  [phone]

## 2022-05-06 ASSESSMENT — ENCOUNTER SYMPTOMS
STIFFNESS: 0
JOINT SWELLING: 0
NECK PAIN: 0
MUSCLE CRAMPS: 0
BACK PAIN: 1
MUSCLE WEAKNESS: 0
MYALGIAS: 0
ARTHRALGIAS: 0

## 2022-05-08 NOTE — PROGRESS NOTES
HPI:    Mr. Koehler comes in for a physical today.  Last visit with us 3/23/2021. He continues to exercise. He does not smoke. No worrisome HEENT, cardiopulmonary, abdominal, , neurological, systemic, psychiatric, lymphatic, endocrine, vascular complaints. He does not check his BP at home.     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 Grossly normal neurological exam.     A/P:    1. Immunizations; Tdap 7/25/2011 Pneumococcal 23 done 4/2/2014 Prevnar 13 done 4/29/2015. Check with insurance regarding Shingrix. Pfizer COVID vaccine x 4.  2. Increased lipids on Atorvastatin and ordered labs  3. HTN on Losartan and HcTz and ordered labs. Elevated today and ordered 24 hour BP monitor   4. PSA; done 8/25/2020 2.90 and seen Urology Dr. Medellin in the  for prostate nodule. He saw 8/26/2020  5. Colonoscopy; 11/12/2018 and repeat in 10 years   6. Dermatology. He does not feel he needs to see Dermatology for skin screening

## 2022-05-09 ENCOUNTER — OFFICE VISIT (OUTPATIENT)
Dept: INTERNAL MEDICINE | Facility: CLINIC | Age: 72
End: 2022-05-09
Payer: COMMERCIAL

## 2022-05-09 VITALS
DIASTOLIC BLOOD PRESSURE: 92 MMHG | SYSTOLIC BLOOD PRESSURE: 151 MMHG | HEART RATE: 71 BPM | OXYGEN SATURATION: 97 % | BODY MASS INDEX: 25.53 KG/M2 | WEIGHT: 178.3 LBS | HEIGHT: 70 IN

## 2022-05-09 DIAGNOSIS — R97.20 ELEVATED PROSTATE SPECIFIC ANTIGEN (PSA): ICD-10-CM

## 2022-05-09 DIAGNOSIS — E78.00 HIGH BLOOD CHOLESTEROL: ICD-10-CM

## 2022-05-09 DIAGNOSIS — I10 BENIGN ESSENTIAL HYPERTENSION: Primary | ICD-10-CM

## 2022-05-09 DIAGNOSIS — R03.0 ELEVATED BLOOD-PRESSURE READING, WITHOUT DIAGNOSIS OF HYPERTENSION: ICD-10-CM

## 2022-05-09 DIAGNOSIS — N52.8 OTHER MALE ERECTILE DYSFUNCTION: ICD-10-CM

## 2022-05-09 DIAGNOSIS — Z12.5 ENCOUNTER FOR SCREENING FOR MALIGNANT NEOPLASM OF PROSTATE: ICD-10-CM

## 2022-05-09 PROCEDURE — 99397 PER PM REEVAL EST PAT 65+ YR: CPT | Performed by: INTERNAL MEDICINE

## 2022-05-09 RX ORDER — SILDENAFIL 50 MG/1
50 TABLET, FILM COATED ORAL DAILY PRN
Qty: 10 TABLET | Refills: 4 | Status: SHIPPED | OUTPATIENT
Start: 2022-05-09

## 2022-05-09 ASSESSMENT — PAIN SCALES - GENERAL: PAINLEVEL: NO PAIN (0)

## 2022-05-16 ENCOUNTER — LAB (OUTPATIENT)
Dept: LAB | Facility: CLINIC | Age: 72
End: 2022-05-16
Payer: COMMERCIAL

## 2022-05-16 ENCOUNTER — HOSPITAL ENCOUNTER (OUTPATIENT)
Dept: CARDIOLOGY | Facility: CLINIC | Age: 72
Discharge: HOME | End: 2022-05-16
Attending: INTERNAL MEDICINE | Admitting: INTERNAL MEDICINE
Payer: COMMERCIAL

## 2022-05-16 DIAGNOSIS — Z12.5 ENCOUNTER FOR SCREENING FOR MALIGNANT NEOPLASM OF PROSTATE: ICD-10-CM

## 2022-05-16 DIAGNOSIS — I10 BENIGN ESSENTIAL HYPERTENSION: ICD-10-CM

## 2022-05-16 DIAGNOSIS — E78.00 HIGH BLOOD CHOLESTEROL: ICD-10-CM

## 2022-05-16 DIAGNOSIS — R03.0 ELEVATED BLOOD-PRESSURE READING, WITHOUT DIAGNOSIS OF HYPERTENSION: ICD-10-CM

## 2022-05-16 DIAGNOSIS — R97.20 ELEVATED PROSTATE SPECIFIC ANTIGEN (PSA): ICD-10-CM

## 2022-05-16 LAB
ALBUMIN SERPL-MCNC: 3.7 G/DL (ref 3.4–5)
ALP SERPL-CCNC: 78 U/L (ref 40–150)
ALT SERPL W P-5'-P-CCNC: 22 U/L (ref 0–70)
ANION GAP SERPL CALCULATED.3IONS-SCNC: 6 MMOL/L (ref 3–14)
AST SERPL W P-5'-P-CCNC: 21 U/L (ref 0–45)
BASOPHILS # BLD AUTO: 0 10E3/UL (ref 0–0.2)
BASOPHILS NFR BLD AUTO: 1 %
BILIRUB SERPL-MCNC: 0.9 MG/DL (ref 0.2–1.3)
BUN SERPL-MCNC: 22 MG/DL (ref 7–30)
CALCIUM SERPL-MCNC: 8.8 MG/DL (ref 8.5–10.1)
CHLORIDE BLD-SCNC: 107 MMOL/L (ref 94–109)
CHOLEST SERPL-MCNC: 178 MG/DL
CO2 SERPL-SCNC: 28 MMOL/L (ref 20–32)
CREAT SERPL-MCNC: 0.89 MG/DL (ref 0.66–1.25)
EOSINOPHIL # BLD AUTO: 0.3 10E3/UL (ref 0–0.7)
EOSINOPHIL NFR BLD AUTO: 5 %
ERYTHROCYTE [DISTWIDTH] IN BLOOD BY AUTOMATED COUNT: 13 % (ref 10–15)
FASTING STATUS PATIENT QL REPORTED: YES
GFR SERPL CREATININE-BSD FRML MDRD: >90 ML/MIN/1.73M2
GLUCOSE BLD-MCNC: 104 MG/DL (ref 70–99)
HCT VFR BLD AUTO: 46.6 % (ref 40–53)
HDLC SERPL-MCNC: 51 MG/DL
HGB BLD-MCNC: 15.5 G/DL (ref 13.3–17.7)
IMM GRANULOCYTES # BLD: 0 10E3/UL
IMM GRANULOCYTES NFR BLD: 0 %
LDLC SERPL CALC-MCNC: 102 MG/DL
LYMPHOCYTES # BLD AUTO: 2.1 10E3/UL (ref 0.8–5.3)
LYMPHOCYTES NFR BLD AUTO: 34 %
MCH RBC QN AUTO: 31.4 PG (ref 26.5–33)
MCHC RBC AUTO-ENTMCNC: 33.3 G/DL (ref 31.5–36.5)
MCV RBC AUTO: 95 FL (ref 78–100)
MONOCYTES # BLD AUTO: 0.6 10E3/UL (ref 0–1.3)
MONOCYTES NFR BLD AUTO: 10 %
NEUTROPHILS # BLD AUTO: 3.2 10E3/UL (ref 1.6–8.3)
NEUTROPHILS NFR BLD AUTO: 50 %
NONHDLC SERPL-MCNC: 127 MG/DL
NRBC # BLD AUTO: 0 10E3/UL
NRBC BLD AUTO-RTO: 0 /100
PLATELET # BLD AUTO: 190 10E3/UL (ref 150–450)
POTASSIUM BLD-SCNC: 3.8 MMOL/L (ref 3.4–5.3)
PROT SERPL-MCNC: 7 G/DL (ref 6.8–8.8)
PSA SERPL-MCNC: 3.71 UG/L (ref 0–4)
RBC # BLD AUTO: 4.93 10E6/UL (ref 4.4–5.9)
SODIUM SERPL-SCNC: 141 MMOL/L (ref 133–144)
TRIGL SERPL-MCNC: 127 MG/DL
WBC # BLD AUTO: 6.3 10E3/UL (ref 4–11)

## 2022-05-16 PROCEDURE — 80061 LIPID PANEL: CPT | Performed by: PATHOLOGY

## 2022-05-16 PROCEDURE — 93790 AMBL BP MNTR W/SW I&R: CPT | Performed by: INTERNAL MEDICINE

## 2022-05-16 PROCEDURE — 36415 COLL VENOUS BLD VENIPUNCTURE: CPT | Performed by: PATHOLOGY

## 2022-05-16 PROCEDURE — 93786 AMBL BP MNTR W/SW REC ONLY: CPT

## 2022-05-16 PROCEDURE — G0103 PSA SCREENING: HCPCS | Performed by: PATHOLOGY

## 2022-05-16 PROCEDURE — 85025 COMPLETE CBC W/AUTO DIFF WBC: CPT | Performed by: PATHOLOGY

## 2022-05-16 PROCEDURE — 80053 COMPREHEN METABOLIC PANEL: CPT | Performed by: PATHOLOGY

## 2022-05-16 PROCEDURE — 93788 AMBL BP MNTR W/SW A/R: CPT

## 2022-05-19 ENCOUNTER — TELEPHONE (OUTPATIENT)
Dept: INTERNAL MEDICINE | Facility: CLINIC | Age: 72
End: 2022-05-19

## 2022-05-19 DIAGNOSIS — R97.20 ELEVATED PROSTATE SPECIFIC ANTIGEN (PSA): Primary | ICD-10-CM

## 2022-05-19 DIAGNOSIS — Z12.5 ENCOUNTER FOR SCREENING FOR MALIGNANT NEOPLASM OF PROSTATE: ICD-10-CM

## 2022-05-19 NOTE — TELEPHONE ENCOUNTER
Placed future PSA order this encounter    Dear Mr. Koehler;    Your laboratory tests are stable. Your PSA is up a little and I recommend we recheck in about 6 months. I placed a future laboratory order for this. You can call [phone] to schedule this laboratory appointment.    MAMIE Joya MD

## 2022-05-28 ENCOUNTER — OFFICE VISIT (OUTPATIENT)
Dept: URGENT CARE | Facility: URGENT CARE | Age: 72
End: 2022-05-28
Payer: COMMERCIAL

## 2022-05-28 VITALS
OXYGEN SATURATION: 98 % | SYSTOLIC BLOOD PRESSURE: 154 MMHG | HEART RATE: 57 BPM | TEMPERATURE: 97.3 F | DIASTOLIC BLOOD PRESSURE: 90 MMHG

## 2022-05-28 DIAGNOSIS — I10 BENIGN ESSENTIAL HYPERTENSION: Primary | ICD-10-CM
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
## 2022-05-28 DIAGNOSIS — S80.861A TICK BITE OF RIGHT LOWER LEG, INITIAL ENCOUNTER: ICD-10-CM

## 2022-05-28 PROCEDURE — 99213 OFFICE O/P EST LOW 20 MIN: CPT | Performed by: FAMILY MEDICINE

## 2022-05-28 RX ORDER — DOXYCYCLINE 100 MG/1
100 CAPSULE ORAL 2 TIMES DAILY
Qty: 20 CAPSULE | Refills: 0 | Status: SHIPPED | OUTPATIENT
Start: 2022-05-28 | End: 2022-06-07

## 2022-05-28 NOTE — PROGRESS NOTES
SUBJECTIVE:                                                    Franky Koehler is a 71 year old male who presents to clinic today for the following health issues: tick bite  Happened:   10 days ago was out mowing the lawn and noticed a tick and pulled it off  However the bite is still irritated  And patient wants to make sure no lyme   Location: right lower leg   Removed: yes   No fevers or chills chest pain or shortness of breath   Is the tick a deer tick: YES  Possibly been attached for >36 hours: YES  Is it within 72 hours since the bite: NO  Problem list and histories reviewed & adjusted, as indicated.  Additional history: as documented    BP elevated today but asymptomatic. No headache no blurring of vision no chest pain no shortness of breath no dizziness no unsteadiness no numbness no weakness      Problem list, Medication list, Allergies, and Medical/Social/Surgical histories reviewed in EPIC and updated as appropriate.    ROS:  Constitutional, HEENT, cardiovascular, pulmonary, gi and gu systems are negative, except as otherwise noted.    OBJECTIVE:                                                    BP (!) 154/90   Pulse 57   Temp 97.3  F (36.3  C) (Tympanic)   SpO2 98%   There is no height or weight on file to calculate BMI.  GENERAL: healthy, alert and no distress  EYES: Eyes grossly normal to inspection, PERRL and conjunctivae and sclerae normal  MS: no gross musculoskeletal defects noted, no edema  SKIN:   Tick bite location: right lower leg   Redness around the tick bite: yes  measuring 2 cm   Tender: No  Fluctuant: No  Remaining tick parts: no  NEURO: Normal strength and tone, mentation intact and speech normal  PSYCH: mentation appears normal, affect normal/bright    Diagnostic Test Results:  No results found for this or any previous visit (from the past 24 hour(s)).           ASSESSMENT/PLAN:                                                        ICD-10-CM    1. Benign essential hypertension  I10           ICD-10-CM    1. Benign essential hypertension  I10    2. Tick bite of right lower leg, initial encounter  S80.861A doxycycline monohydrate (MONODOX) 100 MG capsule    W57.XXXA      Some redness around - patient has been scratching it and it appears somewhat bruised.  Not a definite diagnosis for Lyme's however we discussed risk vs benefit of empiric treatment  Patient would like empiric treatment    Prescribed with doxycycline   Aware of the risks of GI intolerance, GERD, GI complications and photosensitivity with doxycycline.  Signs or symptoms of lyme's disease and erythema migrans discussed in detail  Adverse reactions of medications discussed.  Over the counter medications discussed.   Aware to come back in if with worsening symptoms or if no relief despite treatment plan  Patient voiced understanding and had no further questions.     Your blood pressure reading was elevated today.  Recommend that you have it re-checked either at home or at our clinic within a week  Patient has a follow up with his primary care provider to discuss BP 24 hour monitoring   If your blood pressure top number (systolic) 180 and above, or the bottom number (diastolic) 120 and above, you need to be seen immediately.  If persistently elevated top number 140 and above, or the bottom number 90 and above, please schedule an appointment to see a provider in clinic within a week.  If you have very elevated blood pressures, accompanied by headache, chest pain, numbness, weakness, slurring of speech, confusion, difficulty walking, call 911 and go to the ER      MD Clarita Munoz MD  [facility] [place]

## 2022-06-29 DIAGNOSIS — E78.00 HIGH BLOOD CHOLESTEROL: ICD-10-CM

## 2022-06-29 DIAGNOSIS — I10 BENIGN ESSENTIAL HYPERTENSION: ICD-10-CM

## 2022-06-29 DIAGNOSIS — I10 ESSENTIAL HYPERTENSION: ICD-10-CM

## 2022-07-01 RX ORDER — ATORVASTATIN CALCIUM 10 MG/1
10 TABLET, FILM COATED ORAL DAILY
Qty: 90 TABLET | Refills: 3 | Status: SHIPPED | OUTPATIENT
Start: 2022-07-01 | End: 2023-08-01

## 2022-07-01 RX ORDER — HYDROCHLOROTHIAZIDE 12.5 MG/1
12.5 TABLET ORAL DAILY
Qty: 90 TABLET | Refills: 3 | Status: SHIPPED | OUTPATIENT
Start: 2022-07-01 | End: 2023-08-01

## 2022-07-01 NOTE — TELEPHONE ENCOUNTER
atorvastatin (LIPITOR) 10 MG tablet  Last Written Prescription Date:  8/4/2021  Last Fill Quantity: 90,   # refills: 2  Last Office Visit :  5/9/2022  Future Office visit:  None  90 Tabs, 3 Refills sent to pharm       hydrochlorothiazide (HYDRODIURIL) 12.5 MG tablet  Last Written Prescription Date:  8/4/2021  Last Fill Quantity: 90,   # refills: 2  Last Office Visit :  5/9/2022  Future Office visit:  None  90 Tabs, 3 Refills sent to pharm       Anita Thomas RN  Central Triage Red Flags/Med Refills

## 2022-10-23 ENCOUNTER — HEALTH MAINTENANCE LETTER (OUTPATIENT)
Age: 72
End: 2022-10-23

## 2022-11-16 ENCOUNTER — LAB (OUTPATIENT)
Dept: LAB | Facility: CLINIC | Age: 72
End: 2022-11-16
Payer: COMMERCIAL

## 2022-11-16 DIAGNOSIS — Z12.5 ENCOUNTER FOR SCREENING FOR MALIGNANT NEOPLASM OF PROSTATE: ICD-10-CM

## 2022-11-16 DIAGNOSIS — R97.20 ELEVATED PROSTATE SPECIFIC ANTIGEN (PSA): ICD-10-CM

## 2022-11-16 LAB — PSA SERPL-MCNC: 5.84 NG/ML (ref 0–6.5)

## 2022-11-16 PROCEDURE — 36415 COLL VENOUS BLD VENIPUNCTURE: CPT | Performed by: PATHOLOGY

## 2022-11-16 PROCEDURE — G0103 PSA SCREENING: HCPCS | Performed by: PATHOLOGY

## 2022-11-21 NOTE — TELEPHONE ENCOUNTER
MEDICAL RECORDS REQUEST   [facility]  Urology Clinic  [address] [place] [address]  PHONE: [phone]  Fax: [phone]        FUTURE VISIT INFORMATION                                                   Franky CLARICE Rodo, : 1950 scheduled for future visit at [facility]    APPOINTMENT INFORMATION:    Date: 12/15/2022    Provider:  Bhavesh Jara MD    Reason for Visit/Diagnosis: Elevated prostate specific antigen (PSA)    REFERRAL INFORMATION:    Referring provider:  Fredy Joya MD in [facility]    RECORDS REQUESTED FOR VISIT                                                     NOTES  STATUS/DETAILS   OFFICE NOTE from referring provider  yes, 2022 -- Fredy Joya MD in [facility]     OFFICE NOTE from other specialist  yes, 10/13/2020 -- Lacie  2020 -- Lahti   MEDICATION LIST  yes   LABS     PSA (LAB)  yes     PRE-VISIT CHECKLIST      Record collection complete Yes   Appointment appropriately scheduled           (right time/right provider) Yes   Joint diagnostic appointment coordinated correctly          (ensure right order & amount of time) Yes   MyChart activation Yes   Questionnaire complete If no, please explain pending     
Yes...

## 2022-12-01 DIAGNOSIS — E78.00 HIGH BLOOD CHOLESTEROL: ICD-10-CM

## 2022-12-01 DIAGNOSIS — N52.8 OTHER MALE ERECTILE DYSFUNCTION: ICD-10-CM

## 2022-12-01 DIAGNOSIS — I10 BENIGN ESSENTIAL HYPERTENSION: ICD-10-CM

## 2022-12-02 RX ORDER — LOSARTAN POTASSIUM 50 MG/1
100 TABLET ORAL DAILY
Qty: 180 TABLET | Refills: 0 | Status: SHIPPED | OUTPATIENT
Start: 2022-12-02 | End: 2023-02-23

## 2022-12-02 RX ORDER — LOSARTAN POTASSIUM 50 MG/1
TABLET ORAL
Qty: 180 TABLET | Refills: 2
Start: 2022-12-02

## 2022-12-02 NOTE — TELEPHONE ENCOUNTER
Patient up to bathroom with assist x2. unable to void at this time. Patient transferred to mother/baby room 2198 per wheelchair in stable condition with baby and personal belongings. Accompanied by significant other and staff. Report given to mother/baby RN. losartan (COZAAR) 50 MG tablet 180 tablet 2 8/5/2021         Last Written Prescription Date:  8-5-2021  Last Fill Quantity: 180,   # refills: 2  Last Office Visit : 5-9-2022  Future Office visit:  none    Routing refill request to provider for review/approval because:  BP remains high  Large gap in therapy - should have needed a refill in February.      Kathleen M Doege RN

## 2022-12-06 ENCOUNTER — PRE VISIT (OUTPATIENT)
Dept: UROLOGY | Facility: CLINIC | Age: 72
End: 2022-12-06

## 2022-12-06 NOTE — CONFIDENTIAL NOTE
Reason for visit: consult    Relevant information: rise in psa    Records/imaging/labs/orders: in epic    At Rooming: video    Rosio Peterson  12/6/2022  12:15 PM

## 2022-12-15 ENCOUNTER — VIRTUAL VISIT (OUTPATIENT)
Dept: UROLOGY | Facility: CLINIC | Age: 72
End: 2022-12-15
Payer: COMMERCIAL

## 2022-12-15 ENCOUNTER — PRE VISIT (OUTPATIENT)
Dept: UROLOGY | Facility: CLINIC | Age: 72
End: 2022-12-15

## 2022-12-15 DIAGNOSIS — R97.20 ELEVATED PROSTATE SPECIFIC ANTIGEN (PSA): ICD-10-CM

## 2022-12-15 PROCEDURE — 99204 OFFICE O/P NEW MOD 45 MIN: CPT | Mod: 95 | Performed by: UROLOGY

## 2022-12-15 NOTE — PROGRESS NOTES
Video-Visit Details    Type of service:  Video Visit    Video Start Time (time video started): 1 PM     Video End Time (time video stopped): 1:14 PM     Originating Location (pt. Location): Home        Distant Location (provider location):  On-site    Mode of Communication:  Video Conference via [employer]ocher          Chief Complaint:   Elevated PSA          Consult or Referral:     Mr. Franky Koehler is a 72 year old male seen in consultation from Dr. Joya.         History of Present Illness:    Franky Koehler is a very pleasant 72 year old male who presents with elevated PSA. He was seen by Dr. Medellin in 2020 and based on his exam and PSA level, no further work up was recommended. His PSA continues to rise despite being in the normal range. denies  lower urinary symptoms. 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            Family History:     Family History   Problem Relation Age of Onset     Glaucoma No family hx of      Macular Degeneration No family hx of             Social History:     Social History     Socioeconomic History     Marital status:      Spouse name: Not on file     Number of children: Not on file     Years of education: Not on file      Highest education level: Not on file   Occupational History     Not on file   Tobacco Use     Smoking status: Former     Packs/day: 1.50     Years: 30.00     Pack years: 45.00     Types: Cigarettes     Quit date: 2002     Years since quittin.3     Smokeless tobacco: Never   Substance and Sexual Activity     Alcohol use: Yes     Alcohol/week: 0.0 standard drinks     Comment: 12 per week     Drug use: No     Types: Marijuana     Sexual activity: Yes     Birth control/protection: Surgical   Other Topics Concern     Parent/sibling w/ CABG, MI or angioplasty before 65F 55M? Not Asked   Social History Narrative     Not on file     Social Determinants of Health     Financial Resource Strain: Not on file   Food Insecurity: Not on file   Transportation Needs: Not on file   Physical Activity: Not on file   Stress: Not on file   Social Connections: Not on file   Intimate Partner Violence: Not on file   Housing Stability: Not on file            Allergies:   No known drug allergy         Review of Systems     10 point ROS of systems including Constitutional, Eyes, Respiratory, Cardiovascular, Gastroenterology, Genitourinary, Integumentary, Muscularskeletal, Psychiatric were all negative except for pertinent positives noted in my HPI.          Physical Exam:     Vitals:  No vitals were obtained today due to virtual visit.    Physical Exam   GENERAL: Healthy, alert and no distress  EYES: Eyes grossly normal to inspection.  No discharge or erythema, or obvious scleral/conjunctival abnormalities.  RESP: No audible wheeze, cough, or visible cyanosis.  No visible retractions or increased work of breathing.    SKIN: Visible skin clear. No significant rash, abnormal pigmentation or lesions.  NEURO: Cranial nerves grossly intact.  Mentation and speech appropriate for age.  PSYCH: Mentation appears normal, affect normal/bright, judgement and insight intact, normal speech and appearance well-groomed.          Labs and Pathology:     I reviewed all applicable laboratory and pathology data and went over findings with patient  Significant for     Lab Results   Component Value Date/Time    PSA 5.84 11/16/2022 08:54 AM    PSA 3.71 05/16/2022 08:01 AM    PSA 2.90 08/25/2020 09:02 AM    PSA 2.21 06/18/2019 08:21 AM    PSA 2.04 06/19/2017 04:42 PM    PSA 1.78 05/03/2016 03:08 PM    PSA 1.81 04/29/2015 04:54 PM    PSA 1.19 05/05/2014 02:41 PM    PSA 1.29 10/19/2012 04:02 PM               Imaging:    No relevant imaging to review today       Outside and Past Medical records:    I spent 10 minutes reviewing outside and past medical records.         Assessment and Plan:     Assessment:   72 year old male with elevated PSA     We had a long discussion about the utility of PSA screening.  We talked about the Pros and Cons.  Given his rising PSA, it is reasonable to proceed with a full work up.       We discussed the emerging role of MRI in the diagnosis of prostate cancer and the potential for performing MRI-Ultrasound Fusion biopsy if suspicious lesions are noted.     We also discussed some of the new methods of risk stratification for prostate cancer including 4K, PHI, Exsome Dx to identify clinically significant and aggressive prostate cancer before biopsy.     Patient has decided he would like to proceed with prostate MRI       Plan:  Schedule for prostate MRI   Call with the results to discuss the next steps     45 total minutes spent on the date of the encounter including direct interaction with the patient, performing chart review, documentation and further activities as noted above.        Bhavesh Jara MD   Department of Urology   [facility]

## 2022-12-15 NOTE — LETTER
12/15/2022       RE: Franky Koehler  [address]  [facility] [address]     Dear Colleague,    Thank you for referring your patient, Franky Koehler, to the [facility] UROLOGY CLINIC [place] at [facility]. Please see a copy of my visit note below.    Video-Visit Details    Type of service:  Video Visit    Video Start Time (time video started): 1 PM     Video End Time (time video stopped): 1:14 PM     Originating Location (pt. Location): Home        Distant Location (provider location):  On-site    Mode of Communication:  Video Conference via [employer]    Shelby Kocher          Chief Complaint:   Elevated PSA          Consult or Referral:     Mr. Franky Koehler is a 72 year old male seen in consultation from Dr. Joya.         History of Present Illness:    Franky Koehler is a very pleasant 72 year old male who presents with elevated PSA. He was seen by Dr. Medellin in 2020 and based on his exam and PSA level, no further work up was recommended. His PSA continues to rise despite being in the normal range. denies  lower urinary symptoms. 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            Family History:     Family History    Problem Relation Age of Onset     Glaucoma No family hx of      Macular Degeneration No family hx of             Social History:     Social History     Socioeconomic History     Marital status:      Spouse name: Not on file     Number of children: Not on file     Years of education: Not on file     Highest education level: Not on file   Occupational History     Not on file   Tobacco Use     Smoking status: Former     Packs/day: 1.50     Years: 30.00     Pack years: 45.00     Types: Cigarettes     Quit date: 2002     Years since quittin.3     Smokeless tobacco: Never   Substance and Sexual Activity     Alcohol use: Yes     Alcohol/week: 0.0 standard drinks     Comment: 12 per week     Drug use: No     Types: Marijuana     Sexual activity: Yes     Birth control/protection: Surgical   Other Topics Concern     Parent/sibling w/ CABG, MI or angioplasty before 65F 55M? Not Asked   Social History Narrative     Not on file     Social Determinants of Health     Financial Resource Strain: Not on file   Food Insecurity: Not on file   Transportation Needs: Not on file   Physical Activity: Not on file   Stress: Not on file   Social Connections: Not on file   Intimate Partner Violence: Not on file   Housing Stability: Not on file            Allergies:   No known drug allergy         Review of Systems     10 point ROS of systems including Constitutional, Eyes, Respiratory, Cardiovascular, Gastroenterology, Genitourinary, Integumentary, Muscularskeletal, Psychiatric were all negative except for pertinent positives noted in my HPI.          Physical Exam:     Vitals:  No vitals were obtained today due to virtual visit.    Physical Exam   GENERAL: Healthy, alert and no distress  EYES: Eyes grossly normal to inspection.  No discharge or erythema, or obvious scleral/conjunctival abnormalities.  RESP: No audible wheeze, cough, or visible cyanosis.  No visible retractions or increased work of breathing.    SKIN:  Visible skin clear. No significant rash, abnormal pigmentation or lesions.  NEURO: Cranial nerves grossly intact.  Mentation and speech appropriate for age.  PSYCH: Mentation appears normal, affect normal/bright, judgement and insight intact, normal speech and appearance well-groomed.          Labs and Pathology:    I reviewed all applicable laboratory and pathology data and went over findings with patient  Significant for     Lab Results   Component Value Date/Time    PSA 5.84 11/16/2022 08:54 AM    PSA 3.71 05/16/2022 08:01 AM    PSA 2.90 08/25/2020 09:02 AM    PSA 2.21 06/18/2019 08:21 AM    PSA 2.04 06/19/2017 04:42 PM    PSA 1.78 05/03/2016 03:08 PM    PSA 1.81 04/29/2015 04:54 PM    PSA 1.19 05/05/2014 02:41 PM    PSA 1.29 10/19/2012 04:02 PM               Imaging:    No relevant imaging to review today       Outside and Past Medical records:    I spent 10 minutes reviewing outside and past medical records.         Assessment and Plan:     Assessment:   72 year old male with elevated PSA     We had a long discussion about the utility of PSA screening.  We talked about the Pros and Cons.  Given his rising PSA, it is reasonable to proceed with a full work up.       We discussed the emerging role of MRI in the diagnosis of prostate cancer and the potential for performing MRI-Ultrasound Fusion biopsy if suspicious lesions are noted.     We also discussed some of the new methods of risk stratification for prostate cancer including 4K, PHI, Exsome Dx to identify clinically significant and aggressive prostate cancer before biopsy.     Patient has decided he would like to proceed with prostate MRI       Plan:  Schedule for prostate MRI   Call with the results to discuss the next steps     45 total minutes spent on the date of the encounter including direct interaction with the patient, performing chart review, documentation and further activities as noted above.        Bhavesh Jara MD   Department of Urology    [facility]

## 2022-12-15 NOTE — NURSING NOTE
Franky Koehler  is being evaluated via a billable video visit.      How would you like to obtain your AVS? [employer]  For the video visit, send the invitation by: Text to cell phone: [phone]  Will anyone else be joining your video visit? No Shelby Kocher

## 2023-01-02 ENCOUNTER — ANCILLARY PROCEDURE (OUTPATIENT)
Dept: MRI IMAGING | Facility: CLINIC | Age: 73
End: 2023-01-02
Attending: UROLOGY
Payer: COMMERCIAL

## 2023-01-02 DIAGNOSIS — R97.20 ELEVATED PROSTATE SPECIFIC ANTIGEN (PSA): ICD-10-CM

## 2023-01-02 PROCEDURE — 72197 MRI PELVIS W/O & W/DYE: CPT | Performed by: STUDENT IN AN ORGANIZED HEALTH CARE EDUCATION/TRAINING PROGRAM

## 2023-01-02 PROCEDURE — A9585 GADOBUTROL INJECTION: HCPCS | Performed by: STUDENT IN AN ORGANIZED HEALTH CARE EDUCATION/TRAINING PROGRAM

## 2023-01-02 RX ORDER — GADOBUTROL 604.72 MG/ML
10 INJECTION INTRAVENOUS ONCE
Status: COMPLETED | OUTPATIENT
Start: 2023-01-02 | End: 2023-01-02

## 2023-01-02 RX ADMIN — GADOBUTROL 8 ML: 604.72 INJECTION INTRAVENOUS at 12:40

## 2023-01-02 NOTE — DISCHARGE INSTRUCTIONS
MRI Contrast Discharge Instructions    The IV contrast you received today will pass out of your body in your  urine. This will happen in the next 24 hours. You will not feel this process.  Your urine will not change color.    Drink at least 4 extra glasses of water or juice today (unless your doctor  has restricted your fluids). This reduces the stress on your kidneys.  You may take your regular medicines.    If you are on dialysis: It is best to have dialysis today.    If you have a reaction: Most reactions happen right away. If you have  any new symptoms after leaving the hospital (such as hives or swelling),  call your hospital at the correct number below. Or call your family doctor.  If you have breathing distress or wheezing, call 911.    Special instructions: ***    I have read and understand the above information.    Signature:______________________________________ Date:___________    Staff:__________________________________________ Date:___________     Time:__________    [place] [facility] Departments:    ___[place]: [phone]  ___[facility]: [phone]  ___[place]: [phone] ___[facility]: [phone]  ___[facility]: [phone]  ___[facility]: [phone]  ___[phone]  ___[facility]: [phone]  ___[phone]

## 2023-01-06 ENCOUNTER — TELEPHONE (OUTPATIENT)
Dept: UROLOGY | Facility: CLINIC | Age: 73
End: 2023-01-06

## 2023-01-06 NOTE — TELEPHONE ENCOUNTER
Writer reached out to pt to schedule for a prostate biopsy with Dr. Jara on 2/15/2023. Pt expressed he will be in [place] for the entire month on February and March. Writer confirmed with pt that we will keep him on a wait list for if there are any cancellations in the month of January. Pt is currently scheduled for April for prostate biopsy.    Rosio Peterson MA  January 6, 2023  9:35 AM

## 2023-02-20 DIAGNOSIS — E78.00 HIGH BLOOD CHOLESTEROL: ICD-10-CM

## 2023-02-20 DIAGNOSIS — N52.8 OTHER MALE ERECTILE DYSFUNCTION: ICD-10-CM

## 2023-02-20 DIAGNOSIS — I10 BENIGN ESSENTIAL HYPERTENSION: ICD-10-CM

## 2023-02-23 RX ORDER — LOSARTAN POTASSIUM 50 MG/1
100 TABLET ORAL DAILY
Qty: 180 TABLET | Refills: 1 | Status: SHIPPED | OUTPATIENT
Start: 2023-02-23 | End: 2023-07-31

## 2023-02-23 NOTE — TELEPHONE ENCOUNTER
losartan (COZAAR) 50 MG tablet Take 2 tablets (100 mg) by mouth daily   Last Written Prescription Date:  12/2/22  Last Fill Quantity: 180,   # refills: 0   Last Office Visit : 5-9-2022  Future Office visit:  none         5/16/22  24 hour BP monitor result reviewed by Dr Joya 5/28/22 Dear Franky;     Your 24 hour BP monitor looks fine with overall blood pressure of 121/74     MAMIE Joya

## 2023-03-27 ENCOUNTER — PRE VISIT (OUTPATIENT)
Dept: UROLOGY | Facility: CLINIC | Age: 73
End: 2023-03-27
Payer: COMMERCIAL

## 2023-03-27 DIAGNOSIS — R97.20 ELEVATED PROSTATE SPECIFIC ANTIGEN (PSA): Primary | ICD-10-CM

## 2023-03-27 RX ORDER — CIPROFLOXACIN 500 MG/1
500 TABLET, FILM COATED ORAL 2 TIMES DAILY
Qty: 6 TABLET | Refills: 0 | Status: SHIPPED | OUTPATIENT
Start: 2023-03-27 | End: 2023-03-30

## 2023-03-27 NOTE — CONFIDENTIAL NOTE
Reason for visit: fusion bx     Relevant information: elevated psa, pirads 4, 2 lesions    Pt called: LVM with prep for pt and requested he call back with his preferred pharmacy for antibiotic to be sent.    Records/imaging/labs/orders: in epic    At Rooming: verify procedure prep      Called patient to go over prep for biopsy including:      No blood thinning medications for 7 days before procedure, including aspirin, anticoagulants, ibuprofen and fish oil.       prophylactic antibiotics sent to primary pharmacy listed in chart:   -take the day before, the day of and the day after the procedure, one tablet, two times daily.      Complete a Fleets enema approximately 2 hours before the scheduled procedure.      Do not come to the appointment fasted.    Rosio Peterson  3/27/2023  10:11 AM

## 2023-04-05 ENCOUNTER — OFFICE VISIT (OUTPATIENT)
Dept: UROLOGY | Facility: CLINIC | Age: 73
End: 2023-04-05
Payer: COMMERCIAL

## 2023-04-05 VITALS
SYSTOLIC BLOOD PRESSURE: 134 MMHG | HEIGHT: 70 IN | DIASTOLIC BLOOD PRESSURE: 84 MMHG | WEIGHT: 175 LBS | BODY MASS INDEX: 25.05 KG/M2 | HEART RATE: 62 BPM

## 2023-04-05 DIAGNOSIS — R97.20 ELEVATED PROSTATE SPECIFIC ANTIGEN (PSA): Primary | ICD-10-CM

## 2023-04-05 PROCEDURE — 76872 US TRANSRECTAL: CPT | Performed by: UROLOGY

## 2023-04-05 PROCEDURE — 76999 ECHO EXAMINATION PROCEDURE: CPT | Performed by: UROLOGY

## 2023-04-05 PROCEDURE — 88305 TISSUE EXAM BY PATHOLOGIST: CPT | Mod: TC | Performed by: UROLOGY

## 2023-04-05 PROCEDURE — 55700 PR BIOPSY OF PROSTATE,NEEDLE/PUNCH: CPT | Performed by: UROLOGY

## 2023-04-05 PROCEDURE — 88305 TISSUE EXAM BY PATHOLOGIST: CPT | Mod: 26 | Performed by: PATHOLOGY

## 2023-04-05 RX ORDER — GENTAMICIN 40 MG/ML
80 INJECTION, SOLUTION INTRAMUSCULAR; INTRAVENOUS ONCE
Status: COMPLETED | OUTPATIENT
Start: 2023-04-05 | End: 2023-04-05

## 2023-04-05 RX ADMIN — GENTAMICIN 80 MG: 40 INJECTION, SOLUTION INTRAMUSCULAR; INTRAVENOUS at 10:21

## 2023-04-05 ASSESSMENT — PAIN SCALES - GENERAL: PAINLEVEL: NO PAIN (0)

## 2023-04-05 NOTE — PATIENT INSTRUCTIONS
[facility]  Precautions Following a Prostate Biopsy    There are four conditions that you should watch for after a prostate biopsy:    Excessive pain  Bleeding irregularities (passing numerous  dime sized  clots or if your urine looks like cranberry juice)  Fever of 100 degrees or more  If you are unable to urinate      If any of these occur, call the Urology Clinic during normal business hours (M-F, 8:00-4:30) at [phone].  If you experience a problem after normal business hours, call our 24-hour phone number at [phone] and ask for the Urology Resident on call to be paged.    If you experience any discomfort following the biopsy, you may take Tylenol.  DO NOT TAKE ASPIRIN unless specified by your physician.   If the discomfort becomes severe or uncontrolled by medication, contact the Urology Clinic or Urology Resident (after normal business hours).    Do not be alarmed if you have some blood in your stool, in your urine, or ejaculate (semen).  This occurrence is normal and may last up to three (3) or four (4) days, usually intermittently.  Blood in the ejaculate (semen) may last several weeks, up to about a dozen ejaculations.  The blood in your ejaculate may appear as brown streaks, blood tinged, and immediately following a biopsy, it may appear bright red.    If you run a fever above 100 degrees, call the Urology Clinic or Urology Resident (after normal business hours) immediately.  If you are unable to reach your physician or the Resident on call, go to the nearest emergency room.  Explain that you have had a transrectal biopsy of your prostate and what problems you are experiencing.      You should attempt to urinate following your biopsy before you leave the clinic.  If you are unable to urinate four (4) to six (6) hours after you leave the clinic, you will need to contact the Urology Clinic or the Resident on call.  If you are unable to reach your physician or the  Resident on call, go to the nearest emergency room.      If you have any questions or concerns after your biopsy, feel free to contact the Urology Clinic at [phone] during M-F, 8:00-5pm business hours.  If you need to speak with someone after normal business hours, call [phone] and ask for the Resident on call to be paged.

## 2023-04-05 NOTE — LETTER
"2023       RE: Franky Koehler  [address] [facility] [address]     Dear Colleague,    Thank you for referring your patient, Franky Koehler, to the [facility] UROLOGY CLINIC [place] at [facility]. Please see a copy of my visit note below.    Chief Complaint   Patient presents with    Prostate Biopsy       Blood pressure 134/84, pulse 62, height 1.778 m (5' 10\"), weight 79.4 kg (175 lb). Body mass index is 25.11 kg/m .    Patient Active Problem List   Diagnosis    Bursitis    History of actinic keratoses    Cherry angioma    Multiple nevi    Benign essential hypertension       Allergies   Allergen Reactions    No Known Drug Allergy        Current Outpatient Medications   Medication Sig Dispense Refill    atorvastatin (LIPITOR) 10 MG tablet Take 1 tablet (10 mg) by mouth daily 90 tablet 3    hydrochlorothiazide (HYDRODIURIL) 12.5 MG tablet Take 1 tablet (12.5 mg) by mouth daily 90 tablet 3    losartan (COZAAR) 50 MG tablet Take 2 tablets (100 mg) by mouth daily 180 tablet 1    sildenafil (VIAGRA) 50 MG tablet Take 1 tablet (50 mg) by mouth daily as needed 10 tablet 4       Social History     Tobacco Use    Smoking status: Former     Packs/day: 1.50     Years: 30.00     Pack years: 45.00     Types: Cigarettes     Quit date: 2002     Years since quittin.6    Smokeless tobacco: Never   Substance Use Topics    Alcohol use: Yes     Alcohol/week: 0.0 standard drinks of alcohol     Comment: 12 per week    Drug use: No     Types: Marijuana       Invasive Procedure Safety Checklist:    Procedure: MRI fusion TRUS prostate biopsy    Action: Complete sections and checkboxes as appropriate.    Pre-procedure:  1. Patient ID Verified with 2 identifiers (Varsha and  or MRN) : YES    2. Procedure and site verified with patient/designee (when able) : YES    3. Accurate consent documentation in medical record : YES    4. H&P (or appropriate assessment) " documented in medical record : N/A  H&P must be up to 30 days prior to procedure an updated within 24 hours of                 Procedure as applicable.     5. Relevant diagnostic and radiology test results appropriately labeled and displayed as applicable : YES    6. Blood products, implants, devices, and/or special equipment available for the procedure as applicable : YES    7. Procedure site(s) marked with provider initials [Exclusions: none] : NO    8. Marking not required. Reason : Yes  Procedure does not require site marking    Time Out:     Time-Out performed immediately prior to starting procedure, including verbal and active participation of all team members addressing: YES    1. Correct patient identity.  2. Confirmed that the correct side and site are marked.  3. An accurate procedure to be done.  4. Agreement on the procedure to be done.  5. Correct patient position.  6. Relevant images and results are properly labeled and appropriately displayed.  7. The need to administer antibiotics or fluids for irrigation purposes during the procedure as applicable.  8. Safety precautions based on patient history or medication use.    During Procedure: Verification of correct person, site, and procedure occurs any time the responsibility for care of the patient is transferred to another member of the care team.    The following medication was given:     Prior to injection, verified patient identity using patient's name and date of birth.  Due to injection administration, patient instructed to remain in clinic for 15 minutes  afterwards, and to report any adverse reaction to me immediately.    Drug Amount Wasted:  None.  Vial/Syringe: Single dose vial    The following medication was given:       Was there drug waste? Yes  Amount of drug waste (mL): 20 mL.  Reason for waste:  Single use vial    Prior to injection, verified patient identity using patient's name and date of birth.  Due to injection administration,  patient instructed to remain in clinic for 15 minutes  afterwards, and to report any adverse reaction to me immediately.    Drug Amount Wasted:  Yes: 20 mL (10 mg/ml )  Vial/Syringe: Single dose vial    Rosio Peterson  4/5/2023  8:14 AM    PREPROCEDURE DIAGNOSIS: Elevated PSA   POSTPROCEDURE DIAGNOSIS: Elevated PSA  PROCEDURE: Transrectal ultrasound sizing and transrectal ultrasound guided prostatic needle biopsy, including MRI fusion targeting  for Franky Koehler who is a 72 year old male.   SURGEON: Bhavesh Jara MD   ANESTHESIA: 5 mL of 1% periprostatic block bilaterally   We previously obtained an MRI of the prostate and identified all PIRADS 3-5 lesions for targeting    DESCRIPTION OF PROCEDURE: The procedure, the outcome, the anesthesia, and the risks were discussed with the patient. Informed consent was obtained and signed and a timeout was completed prior to the procedure. Digital rectal examination was performed with the below findings noted. Anesthesia was administered as noted above and the transrectal ultrasound probe was inserted, sizing was performed, and the below findings were noted. 2 core biopsies were taken from each of the MRI targets, and 12 core biopsies were taken as described below. The probe was then withdrawn. Patient tolerated the procedure well.     FINDINGS: Digital rectal exam reveals normal prostate. US images were obtained and then fused with the MRI images.  The fused images were then used to guide the biopsy of the targeted lesions with 2 cores taken of each lesion.  We then performed random biopsies.  12 cores were taken with 6 on each side, base, mid and apex.      Bhavesh Jara MD   Department of Urology   [facility]

## 2023-04-05 NOTE — PROGRESS NOTES
"Chief Complaint   Patient presents with     Prostate Biopsy       Blood pressure 134/84, pulse 62, height 1.778 m (5' 10\"), weight 79.4 kg (175 lb). Body mass index is 25.11 kg/m .    Patient Active Problem List   Diagnosis     Bursitis     History of actinic keratoses     Cherry angioma     Multiple nevi     Benign essential hypertension       Allergies   Allergen Reactions     No Known Drug Allergy        Current Outpatient Medications   Medication Sig Dispense Refill     atorvastatin (LIPITOR) 10 MG tablet Take 1 tablet (10 mg) by mouth daily 90 tablet 3     hydrochlorothiazide (HYDRODIURIL) 12.5 MG tablet Take 1 tablet (12.5 mg) by mouth daily 90 tablet 3     losartan (COZAAR) 50 MG tablet Take 2 tablets (100 mg) by mouth daily 180 tablet 1     sildenafil (VIAGRA) 50 MG tablet Take 1 tablet (50 mg) by mouth daily as needed 10 tablet 4       Social History     Tobacco Use     Smoking status: Former     Packs/day: 1.50     Years: 30.00     Pack years: 45.00     Types: Cigarettes     Quit date: 2002     Years since quittin.6     Smokeless tobacco: Never   Substance Use Topics     Alcohol use: Yes     Alcohol/week: 0.0 standard drinks of alcohol     Comment: 12 per week     Drug use: No     Types: Marijuana       Invasive Procedure Safety Checklist:    Procedure: MRI fusion TRUS prostate biopsy    Action: Complete sections and checkboxes as appropriate.    Pre-procedure:  1. Patient ID Verified with 2 identifiers (Varsha and  or MRN) : YES    2. Procedure and site verified with patient/designee (when able) : YES    3. Accurate consent documentation in medical record : YES    4. H&P (or appropriate assessment) documented in medical record : N/A  H&P must be up to 30 days prior to procedure an updated within 24 hours of                 Procedure as applicable.     5. Relevant diagnostic and radiology test results appropriately labeled and displayed as applicable : YES    6. Blood products, implants, " devices, and/or special equipment available for the procedure as applicable : YES    7. Procedure site(s) marked with provider initials [Exclusions: none] : NO    8. Marking not required. Reason : Yes  Procedure does not require site marking    Time Out:     Time-Out performed immediately prior to starting procedure, including verbal and active participation of all team members addressing: YES    1. Correct patient identity.  2. Confirmed that the correct side and site are marked.  3. An accurate procedure to be done.  4. Agreement on the procedure to be done.  5. Correct patient position.  6. Relevant images and results are properly labeled and appropriately displayed.  7. The need to administer antibiotics or fluids for irrigation purposes during the procedure as applicable.  8. Safety precautions based on patient history or medication use.    During Procedure: Verification of correct person, site, and procedure occurs any time the responsibility for care of the patient is transferred to another member of the care team.    The following medication was given:     Prior to injection, verified patient identity using patient's name and date of birth.  Due to injection administration, patient instructed to remain in clinic for 15 minutes  afterwards, and to report any adverse reaction to me immediately.    Drug Amount Wasted:  None.  Vial/Syringe: Single dose vial    The following medication was given:       Was there drug waste? Yes  Amount of drug waste (mL): 20 mL.  Reason for waste:  Single use vial    Prior to injection, verified patient identity using patient's name and date of birth.  Due to injection administration, patient instructed to remain in clinic for 15 minutes  afterwards, and to report any adverse reaction to me immediately.    Drug Amount Wasted:  Yes: 20 mL (10 mg/ml )  Vial/Syringe: Single dose vial    Rosio Peterson  4/5/2023  8:14 AM    PREPROCEDURE DIAGNOSIS: Elevated PSA   POSTPROCEDURE  DIAGNOSIS: Elevated PSA  PROCEDURE: Transrectal ultrasound sizing and transrectal ultrasound guided prostatic needle biopsy, including MRI fusion targeting  for Franky Koehler who is a 72 year old male.   SURGEON: Bhavesh Jara MD   ANESTHESIA: 5 mL of 1% periprostatic block bilaterally   We previously obtained an MRI of the prostate and identified all PIRADS 3-5 lesions for targeting    DESCRIPTION OF PROCEDURE: The procedure, the outcome, the anesthesia, and the risks were discussed with the patient. Informed consent was obtained and signed and a timeout was completed prior to the procedure. Digital rectal examination was performed with the below findings noted. Anesthesia was administered as noted above and the transrectal ultrasound probe was inserted, sizing was performed, and the below findings were noted. 2 core biopsies were taken from each of the MRI targets, and 12 core biopsies were taken as described below. The probe was then withdrawn. Patient tolerated the procedure well.     FINDINGS: Digital rectal exam reveals normal prostate. US images were obtained and then fused with the MRI images.  The fused images were then used to guide the biopsy of the targeted lesions with 2 cores taken of each lesion.  We then performed random biopsies.  12 cores were taken with 6 on each side, base, mid and apex.      Bhavesh Jara MD   Department of Urology   [facility]

## 2023-04-07 LAB
PATH REPORT.COMMENTS IMP SPEC: NORMAL
PATH REPORT.COMMENTS IMP SPEC: NORMAL
PATH REPORT.FINAL DX SPEC: NORMAL
PATH REPORT.GROSS SPEC: NORMAL
PATH REPORT.MICROSCOPIC SPEC OTHER STN: NORMAL
PATH REPORT.RELEVANT HX SPEC: NORMAL
PHOTO IMAGE: NORMAL

## 2023-04-12 ENCOUNTER — VIRTUAL VISIT (OUTPATIENT)
Dept: UROLOGY | Facility: CLINIC | Age: 73
End: 2023-04-12
Payer: COMMERCIAL

## 2023-04-12 DIAGNOSIS — C61 PROSTATE CANCER (H): Primary | ICD-10-CM

## 2023-04-12 PROCEDURE — 99215 OFFICE O/P EST HI 40 MIN: CPT | Mod: VID | Performed by: UROLOGY

## 2023-04-12 NOTE — PROGRESS NOTES
Virtual Visit Details    Type of service:  Video Visit   Video Start Time: 915   Video End Time:0930     Originating Location (pt. Location): Home    Distant Location (provider location):  On-site  Platform used for Video Visit: [facility]      Urology Clinic     HPI  Franky Koehler is a 72 year old male with newly diagnosed prostate cancer, here for follow-up after his prostate biopsy.      The patient denies any major issues after the biopsy     No changes to health, hospitalizations or new diagnoses in the interim    PHYSICAL EXAM  Vitals:  No vitals were obtained today due to virtual visit.    Physical Exam   GENERAL: Healthy, alert and no distress  EYES: Eyes grossly normal to inspection.  No discharge or erythema, or obvious scleral/conjunctival abnormalities.  RESP: No audible wheeze, cough, or visible cyanosis.  No visible retractions or increased work of breathing.    SKIN: Visible skin clear. No significant rash, abnormal pigmentation or lesions.  NEURO: Cranial nerves grossly intact.  Mentation and speech appropriate for age.  PSYCH: Mentation appears normal, affect normal/bright, judgement and insight intact, normal speech and appearance well-groomed.      Labs  Lab Results   Component Value Date    WBC 6.3 05/16/2022    WBC 6.6 03/23/2021     Lab Results   Component Value Date    RBC 4.93 05/16/2022    RBC 5.16 03/23/2021     Lab Results   Component Value Date    HGB 15.5 05/16/2022    HGB 16.2 03/23/2021     Lab Results   Component Value Date    HCT 46.6 05/16/2022    HCT 48.6 03/23/2021     No components found for: MCT  Lab Results   Component Value Date    MCV 95 05/16/2022    MCV 94 03/23/2021     Lab Results   Component Value Date    MCH 31.4 05/16/2022    MCH 31.4 03/23/2021     Lab Results   Component Value Date    MCHC 33.3 05/16/2022    MCHC 33.3 03/23/2021     Lab Results   Component Value Date    RDW 13.0 05/16/2022    RDW 13.1 03/23/2021     Lab Results   Component Value Date      05/16/2022     03/23/2021        Last Comprehensive Metabolic Panel:  Sodium   Date Value Ref Range Status   05/16/2022 141 133 - 144 mmol/L Final   03/23/2021 137 133 - 144 mmol/L Final     Potassium   Date Value Ref Range Status   05/16/2022 3.8 3.4 - 5.3 mmol/L Final   03/23/2021 3.8 3.4 - 5.3 mmol/L Final     Chloride   Date Value Ref Range Status   05/16/2022 107 94 - 109 mmol/L Final   03/23/2021 105 94 - 109 mmol/L Final     Carbon Dioxide   Date Value Ref Range Status   03/23/2021 28 20 - 32 mmol/L Final     Carbon Dioxide (CO2)   Date Value Ref Range Status   05/16/2022 28 20 - 32 mmol/L Final     Anion Gap   Date Value Ref Range Status   05/16/2022 6 3 - 14 mmol/L Final   03/23/2021 4 3 - 14 mmol/L Final     Glucose   Date Value Ref Range Status   05/16/2022 104 (H) 70 - 99 mg/dL Final   03/23/2021 93 70 - 99 mg/dL Final     Urea Nitrogen   Date Value Ref Range Status   05/16/2022 22 7 - 30 mg/dL Final   03/23/2021 17 7 - 30 mg/dL Final     Creatinine   Date Value Ref Range Status   05/16/2022 0.89 0.66 - 1.25 mg/dL Final   03/23/2021 0.77 0.66 - 1.25 mg/dL Final     GFR Estimate   Date Value Ref Range Status   05/16/2022 >90 >60 mL/min/1.73m2 Final     Comment:     Effective December 21, 2021 eGFRcr in adults is calculated using the 2021 CKD-EPI creatinine equation which includes age and gender (Radha et al., NEJM, DOI: 10.1056/JXXBgh7861748)   03/23/2021 >90 >60 mL/min/[1.73_m2] Final     Comment:     Non  GFR Calc  Starting 12/18/2018, serum creatinine based estimated GFR (eGFR) will be   calculated using the Chronic Kidney Disease Epidemiology Collaboration   (CKD-EPI) equation.       Calcium   Date Value Ref Range Status   05/16/2022 8.8 8.5 - 10.1 mg/dL Final   03/23/2021 9.2 8.5 - 10.1 mg/dL Final     Bilirubin Total   Date Value Ref Range Status   05/16/2022 0.9 0.2 - 1.3 mg/dL Final   03/23/2021 1.4 (H) 0.2 - 1.3 mg/dL Final     Alkaline Phosphatase   Date Value Ref Range  Status   05/16/2022 78 40 - 150 U/L Final   03/23/2021 90 40 - 150 U/L Final     ALT   Date Value Ref Range Status   05/16/2022 22 0 - 70 U/L Final   03/23/2021 33 0 - 70 U/L Final     AST   Date Value Ref Range Status   05/16/2022 21 0 - 45 U/L Final   03/23/2021 23 0 - 45 U/L Final       PSA   Date Value Ref Range Status   08/25/2020 2.90 0 - 4 ug/L Final     Comment:     Assay Method:  Chemiluminescence using Siemens Vista analyzer   06/18/2019 2.21 0 - 4 ug/L Final     Comment:     Assay Method:  Chemiluminescence using Siemens Vista analyzer   06/19/2017 2.04 0 - 4 ug/L Final     Comment:     Assay Method:  Chemiluminescence using Siemens Vista analyzer   05/03/2016 1.78 0 - 4 ug/L Final   04/29/2015 1.81 0 - 4 ug/L Final   05/05/2014 1.19 0 - 4 ug/L Final     Comment:     PSA results are about 7% lower than our prior method due to a methodology   change   on August 30, 2011.     10/19/2012 1.29 0 - 4 ug/L Final     Comment:     PSA results are about 7% lower than our prior method due to a methodology   change   on August 30, 2011.     Prostate Specific Antigen Screen   Date Value Ref Range Status   11/16/2022 5.84 0.00 - 6.50 ng/mL Final   05/16/2022 3.71 0.00 - 4.00 ug/L Final        Surgical pathology  Final Diagnosis   A.  Prostate, left base, biopsy:  - Benign prostate tissue    B.  Prostate, left mid, biopsy:  - High-grade prostatic intraepithelial neoplasia (HGPIN)    C.  Prostate, left apex, biopsy:  - Benign prostate tissue    D.  Prostate, right base, biopsy:  - High-grade prostatic intraepithelial neoplasia (HGPIN)  - Focal atypical small acinar proliferation    E.  Prostate, right mid, biopsy:  - Prostatic adenocarcinoma  - [place]uf[place] score 7 (3+4; 5% pattern 4)  - Grade group 2  - Extent: Involves 1 of 2 cores (6 mm, 60%)    F.  Prostate, right apex, biopsy:  - Prostatic adenocarcinoma  - Rufina score 6 (3+3)  - Grade group 1  - Extent: Involves 1 of 2 cores (1 mm, 5%)    G.  Prostate, target 1,  biopsy:  - Benign prostatic tissue    H.  Prostate, target 2, biopsy:  - Prostatic adenocarcinoma  - [place] score 6 (3+3)  - Grade group 1  - Extent: Involves 1 of 2 cores (3 mm, 30%)       Imaging   MR prostate 1/2/2023     FINDINGS:  Size: 4.5 x 3.7 x 4.0 cm, 35 grams  Hemorrhage: Absent  Peripheral zone: Heterogeneous on T2-weighted images. Suspicious  lesions as detailed below.  Transition zone: Enlarged with BPH changes. Transition zone nodules  which are circumscribed or mostly encapsulated without diffusion  restriction.  PI-RADS 2.  No highly suspicious nodules.     Lesion(s) in rank order of severity (highest score- to lowest score,  then by size)      Lesion 1:  Location: Left apex peripheral zone at the 7 o'clock position relative  to the urethra. Series 7 image 67 and series 14, image 23 and series  12, image 22.   Additional prostate regions involved: None  Size: 9mm  T2 description: Moderately T2 hypointense  T2 numerical assessment: 4  DWI description: Focal markedly hyperintense on DWI and ADC  hypointense  DWI numerical assessment: 4  DCE assessment: Positive    Prostate margin: Capsular abutment 6-15 mm with smooth contour  Lesion overall PI-RADS category: 4        Lesion 2:  Location: Left mid gland peripheral zone at the 7 o'clock position  relative to the urethra. Series 7 image 59  Additional prostate regions involved: None  Size: 18 mm  T2 description: Moderately T2 hypointense  T2 numerical assessment: 3  DWI description: Mild hypointense on ADC  DWI numerical assessment: 3  DCE assessment: Negative    Prostate margin: Capsular abutment 6-15 mm with smooth contour  Lesion overall PI-RADS category: 3        Neurovascular bundles: No neurovascular bundle involvement by  malignancy.  Seminal vesicles: No seminal vesicle involvement by malignancy.  Lymph nodes: No lymph node involvement  Bones: No suspicious lesions  Other pelvic organs: No additional findings.                                                                          IMPRESSION:  1. Based on the most suspicious abnormality, this exam is  characterized as PIRADS 4 - Clinically significant cancer is likely to  be present.  The most suspicious abnormality is located at the right  apex peripheral zone and there is short segment capsular abutment with  low likelihood of minimal extraprostatic extension.  2. No bulky adenopathy or evidence of pelvic metastases.       ASSESSMENT AND PLAN  72 year old male with low volume intermediate risk prostate cancer     We had an extensive discussion about the significance of localized, prostate cancer. We discussed the options for treatment of a localized prostate cancer including active surveillance, brachytherapy, external beam radiation therapy, and surgical removal.      We discussed the relative merits of robotic assisted radical prostatectomy. We also discussed the advantages and disadvantages and roles of open surgery vs. laparoscopic (and Da Roseann assisted) surgery. The anticipated post-operative course was explained, including an anticipated 1-2 day hospital stay. Catheter will remain in place 10-14 days.  The risks, benefits, alternatives, and personnel involved in the procedure were discussed. Specifically, we discussed that risks include but are not limited to anesthetic complications including stroke, MI, DVT/PE, as well as risk of bleeding, bowel injury, infection, lymphocele, urine leak and other potentially unforseen complications. Also discussed the risk of bladder neck contracture and other urinary symptoms after procedure. In addition, we discussed risk of urinary incontinence and erectile dysfunction following the procedure. Finally, we discussed risk for adverse pathologic features, including positive surgical margins and potential for post-surgical radiation, hormone ablation and long-term need for PSA monitoring,.  All questions were answered in detail.     We discussed that there was no  clear evidence of advantage between surgery and radiation with regard to risk of recurrence.    The majority of our discussion related to active surveillance for prostate cancer. Active surveillance could be offered to patients with intermediate-risk prostate cancer. However, there is a need for regular monitoring and the high possibility of discontinuation as a result of a higher rate of progression. Available data indicate that 5-year survival rates between intermediate- and low-risk patients do not differ but 10-year survival rates are worse (risk of progression, metastasis and death ~ 50% higher at 10 years).       Plan   Tentative plan for active surveillance   PSA in 6 months   Send [employer] prostate   Call with the results     40 total minutes spent on the date of the encounter including direct interaction with the patient, performing chart review, documentation and further activities as noted above    Bhavesh Jara MD   Department of Urology   [facility]

## 2023-04-12 NOTE — LETTER
4/12/2023       RE: Franky Koehler  [address]  [facility] [address]     Dear Colleague,    Thank you for referring your patient, Franky Koehler, to the [facility] [place] at . Please see a copy of my visit note below.    Virtual Visit Details    Type of service:  Video Visit   Video Start Time: 915   Video End Time:0930     Originating Location (pt. Location): Home    Distant Location (provider location):  On-site  Platform used for Video Visit: [facility]      Urology [facility]  Franky Koehler is a 72 year old male with newly diagnosed prostate cancer, here for follow-up after his prostate biopsy.      The patient denies any major issues after the biopsy     No changes to health, hospitalizations or new diagnoses in the interim    PHYSICAL EXAM  Vitals:  No vitals were obtained today due to virtual visit.    Physical Exam   GENERAL: Healthy, alert and no distress  EYES: Eyes grossly normal to inspection.  No discharge or erythema, or obvious scleral/conjunctival abnormalities.  RESP: No audible wheeze, cough, or visible cyanosis.  No visible retractions or increased work of breathing.    SKIN: Visible skin clear. No significant rash, abnormal pigmentation or lesions.  NEURO: Cranial nerves grossly intact.  Mentation and speech appropriate for age.  PSYCH: Mentation appears normal, affect normal/bright, judgement and insight intact, normal speech and appearance well-groomed.      Labs  Lab Results   Component Value Date    WBC 6.3 05/16/2022    WBC 6.6 03/23/2021     Lab Results   Component Value Date    RBC 4.93 05/16/2022    RBC 5.16 03/23/2021     Lab Results   Component Value Date    HGB 15.5 05/16/2022    HGB 16.2 03/23/2021     Lab Results   Component Value Date    HCT 46.6 05/16/2022    HCT 48.6 03/23/2021     No components found for: MCT  Lab Results   Component Value Date    MCV 95 05/16/2022    MCV 94 03/23/2021     Lab  Results   Component Value Date    MCH 31.4 05/16/2022    MCH 31.4 03/23/2021     Lab Results   Component Value Date    MCHC 33.3 05/16/2022    MCHC 33.3 03/23/2021     Lab Results   Component Value Date    RDW 13.0 05/16/2022    RDW 13.1 03/23/2021     Lab Results   Component Value Date     05/16/2022     03/23/2021        Last Comprehensive Metabolic Panel:  Sodium   Date Value Ref Range Status   05/16/2022 141 133 - 144 mmol/L Final   03/23/2021 137 133 - 144 mmol/L Final     Potassium   Date Value Ref Range Status   05/16/2022 3.8 3.4 - 5.3 mmol/L Final   03/23/2021 3.8 3.4 - 5.3 mmol/L Final     Chloride   Date Value Ref Range Status   05/16/2022 107 94 - 109 mmol/L Final   03/23/2021 105 94 - 109 mmol/L Final     Carbon Dioxide   Date Value Ref Range Status   03/23/2021 28 20 - 32 mmol/L Final     Carbon Dioxide (CO2)   Date Value Ref Range Status   05/16/2022 28 20 - 32 mmol/L Final     Anion Gap   Date Value Ref Range Status   05/16/2022 6 3 - 14 mmol/L Final   03/23/2021 4 3 - 14 mmol/L Final     Glucose   Date Value Ref Range Status   05/16/2022 104 (H) 70 - 99 mg/dL Final   03/23/2021 93 70 - 99 mg/dL Final     Urea Nitrogen   Date Value Ref Range Status   05/16/2022 22 7 - 30 mg/dL Final   03/23/2021 17 7 - 30 mg/dL Final     Creatinine   Date Value Ref Range Status   05/16/2022 0.89 0.66 - 1.25 mg/dL Final   03/23/2021 0.77 0.66 - 1.25 mg/dL Final     GFR Estimate   Date Value Ref Range Status   05/16/2022 >90 >60 mL/min/1.73m2 Final     Comment:     Effective December 21, 2021 eGFRcr in adults is calculated using the 2021 CKD-EPI creatinine equation which includes age and gender (Radha conley al., NEJM, DOI: 10.1056/YQKKvq2272808)   03/23/2021 >90 >60 mL/min/[1.73_m2] Final     Comment:     Non  GFR Calc  Starting 12/18/2018, serum creatinine based estimated GFR (eGFR) will be   calculated using the Chronic Kidney Disease Epidemiology Collaboration   (CKD-EPI) equation.        Calcium   Date Value Ref Range Status   05/16/2022 8.8 8.5 - 10.1 mg/dL Final   03/23/2021 9.2 8.5 - 10.1 mg/dL Final     Bilirubin Total   Date Value Ref Range Status   05/16/2022 0.9 0.2 - 1.3 mg/dL Final   03/23/2021 1.4 (H) 0.2 - 1.3 mg/dL Final     Alkaline Phosphatase   Date Value Ref Range Status   05/16/2022 78 40 - 150 U/L Final   03/23/2021 90 40 - 150 U/L Final     ALT   Date Value Ref Range Status   05/16/2022 22 0 - 70 U/L Final   03/23/2021 33 0 - 70 U/L Final     AST   Date Value Ref Range Status   05/16/2022 21 0 - 45 U/L Final   03/23/2021 23 0 - 45 U/L Final       PSA   Date Value Ref Range Status   08/25/2020 2.90 0 - 4 ug/L Final     Comment:     Assay Method:  Chemiluminescence using Siemens Vista analyzer   06/18/2019 2.21 0 - 4 ug/L Final     Comment:     Assay Method:  Chemiluminescence using Siemens Vista analyzer   06/19/2017 2.04 0 - 4 ug/L Final     Comment:     Assay Method:  Chemiluminescence using Siemens Vista analyzer   05/03/2016 1.78 0 - 4 ug/L Final   04/29/2015 1.81 0 - 4 ug/L Final   05/05/2014 1.19 0 - 4 ug/L Final     Comment:     PSA results are about 7% lower than our prior method due to a methodology   change   on August 30, 2011.     10/19/2012 1.29 0 - 4 ug/L Final     Comment:     PSA results are about 7% lower than our prior method due to a methodology   change   on August 30, 2011.     Prostate Specific Antigen Screen   Date Value Ref Range Status   11/16/2022 5.84 0.00 - 6.50 ng/mL Final   05/16/2022 3.71 0.00 - 4.00 ug/L Final        Surgical pathology  Final Diagnosis   A.  Prostate, left base, biopsy:  - Benign prostate tissue    B.  Prostate, left mid, biopsy:  - High-grade prostatic intraepithelial neoplasia (HGPIN)    C.  Prostate, left apex, biopsy:  - Benign prostate tissue    D.  Prostate, right base, biopsy:  - High-grade prostatic intraepithelial neoplasia (HGPIN)  - Focal atypical small acinar proliferation    E.  Prostate, right mid, biopsy:  -  Prostatic adenocarcinoma  - Rufina score 7 (3+4; 5% pattern 4)  - Grade group 2  - Extent: Involves 1 of 2 cores (6 mm, 60%)    F.  Prostate, right apex, biopsy:  - Prostatic adenocarcinoma  - [place] score 6 (3+3)  - Grade group 1  - Extent: Involves 1 of 2 cores (1 mm, 5%)    G.  Prostate, target 1, biopsy:  - Benign prostatic tissue    H.  Prostate, target 2, biopsy:  - Prostatic adenocarcinoma  - [place] score 6 (3+3)  - Grade group 1  - Extent: Involves 1 of 2 cores (3 mm, 30%)       Imaging   MR prostate 1/2/2023     FINDINGS:  Size: 4.5 x 3.7 x 4.0 cm, 35 grams  Hemorrhage: Absent  Peripheral zone: Heterogeneous on T2-weighted images. Suspicious  lesions as detailed below.  Transition zone: Enlarged with BPH changes. Transition zone nodules  which are circumscribed or mostly encapsulated without diffusion  restriction.  PI-RADS 2.  No highly suspicious nodules.     Lesion(s) in rank order of severity (highest score- to lowest score,  then by size)      Lesion 1:  Location: Left apex peripheral zone at the 7 o'clock position relative  to the urethra. Series 7 image 67 and series 14, image 23 and series  12, image 22.   Additional prostate regions involved: None  Size: 9mm  T2 description: Moderately T2 hypointense  T2 numerical assessment: 4  DWI description: Focal markedly hyperintense on DWI and ADC  hypointense  DWI numerical assessment: 4  DCE assessment: Positive    Prostate margin: Capsular abutment 6-15 mm with smooth contour  Lesion overall PI-RADS category: 4        Lesion 2:  Location: Left mid gland peripheral zone at the 7 o'clock position  relative to the urethra. Series 7 image 59  Additional prostate regions involved: None  Size: 18 mm  T2 description: Moderately T2 hypointense  T2 numerical assessment: 3  DWI description: Mild hypointense on ADC  DWI numerical assessment: 3  DCE assessment: Negative    Prostate margin: Capsular abutment 6-15 mm with smooth contour  Lesion overall PI-RADS  category: 3        Neurovascular bundles: No neurovascular bundle involvement by  malignancy.  Seminal vesicles: No seminal vesicle involvement by malignancy.  Lymph nodes: No lymph node involvement  Bones: No suspicious lesions  Other pelvic organs: No additional findings.                                                                         IMPRESSION:  1. Based on the most suspicious abnormality, this exam is  characterized as PIRADS 4 - Clinically significant cancer is likely to  be present.  The most suspicious abnormality is located at the right  apex peripheral zone and there is short segment capsular abutment with  low likelihood of minimal extraprostatic extension.  2. No bulky adenopathy or evidence of pelvic metastases.       ASSESSMENT AND PLAN  72 year old male with low volume intermediate risk prostate cancer     We had an extensive discussion about the significance of localized, prostate cancer. We discussed the options for treatment of a localized prostate cancer including active surveillance, brachytherapy, external beam radiation therapy, and surgical removal.      We discussed the relative merits of robotic assisted radical prostatectomy. We also discussed the advantages and disadvantages and roles of open surgery vs. laparoscopic (and Da Roseann assisted) surgery. The anticipated post-operative course was explained, including an anticipated 1-2 day hospital stay. Catheter will remain in place 10-14 days.  The risks, benefits, alternatives, and personnel involved in the procedure were discussed. Specifically, we discussed that risks include but are not limited to anesthetic complications including stroke, MI, DVT/PE, as well as risk of bleeding, bowel injury, infection, lymphocele, urine leak and other potentially unforseen complications. Also discussed the risk of bladder neck contracture and other urinary symptoms after procedure. In addition, we discussed risk of urinary incontinence and  erectile dysfunction following the procedure. Finally, we discussed risk for adverse pathologic features, including positive surgical margins and potential for post-surgical radiation, hormone ablation and long-term need for PSA monitoring,.  All questions were answered in detail.     We discussed that there was no clear evidence of advantage between surgery and radiation with regard to risk of recurrence.    The majority of our discussion related to active surveillance for prostate cancer. Active surveillance could be offered to patients with intermediate-risk prostate cancer. However, there is a need for regular monitoring and the high possibility of discontinuation as a result of a higher rate of progression. Available data indicate that 5-year survival rates between intermediate- and low-risk patients do not differ but 10-year survival rates are worse (risk of progression, metastasis and death ~ 50% higher at 10 years).       Plan   Tentative plan for active surveillance   PSA in 6 months   Send [employer] prostate   Call with the results     40 total minutes spent on the date of the encounter including direct interaction with the patient, performing chart review, documentation and further activities as noted above    Bhavesh Jara MD   Department of Urology   [facility]

## 2023-04-12 NOTE — NURSING NOTE
Is the patient currently in the state of [place]? YES    Visit mode:VIDEO    If the visit is dropped, the patient can be reconnected by: VIDEO VISIT: Text to cell phone: [phone]    Will anyone else be joining the visit? NO      How would you like to obtain your AVS? MyChart    Are changes needed to the allergy or medication list? NO    Reason for visit:   Chief Complaint   Patient presents with     Video Visit     Biopsy results

## 2023-04-13 ENCOUNTER — DOCUMENTATION ONLY (OUTPATIENT)
Dept: UROLOGY | Facility: CLINIC | Age: 73
End: 2023-04-13
Payer: COMMERCIAL

## 2023-04-13 NOTE — PROGRESS NOTES
Action 04/13/23 MMT   Action Taken  [facility] [place] submitted order for [facility] Prostate Genomic Testing with supporting documentation per provider request.

## 2023-05-01 ENCOUNTER — MYC MEDICAL ADVICE (OUTPATIENT)
Dept: INTERNAL MEDICINE | Facility: CLINIC | Age: 73
End: 2023-05-01
Payer: COMMERCIAL

## 2023-05-01 DIAGNOSIS — H91.90 DECREASED HEARING, UNSPECIFIED LATERALITY: Primary | ICD-10-CM

## 2023-05-02 NOTE — PROGRESS NOTES
AUDIOLOGY REPORT    SUBJECTIVE:  Franky Koehler is a 72 year old male who was seen on 5/3/23 in the Audiology Clinic at the [facility] for audiologic evaluation, referred by Fredy Joya MD.  The patient has been seen previously in this clinic on 5/26/15 for assessment.  At that time, the patient had normal hearing sloping to mild to moderately sensorineural hearing loss in the right ear and normal hearing sloping to mild to severe rising to moderately severe sensorineural hearing loss in the left ear.  The hearing loss was asymmetrical in the high frequencies, where the left ear was worse than the right ear.  The patient also saw Rick Nissen MD in ENT Clinic that day.  The patient reports hearing has decreased.  He has bilateral symmetric tinnitus (in 2015 this was louder in left ear than right ear).  The patient denies ear pain, drainage from the ears, aural fullness, dizziness, or history of ear surgery.  His noise exposure history includes use of firearms (right handed shooter) and exposure to loud music.  He now uses hearing protection consistently.      OBJECTIVE:  Abuse Screening:  Do you feel unsafe at home or work/school? No  Do you feel threatened by someone? No  Does anyone try to keep you from having contact with others, or doing things outside of your home? No  Physical signs of abuse present? No     Fall Risk Screen:  1. Have you fallen two or more times in the past year? No  2. Have you fallen and had an injury in the past year? No    Timed Up and Go Score (in seconds): not tested  Is patient a fall risk? No  Referral initiated: No  Fall Risk Assessment Completed by Audiology    Otoscopic exam indicates ears are clear of cerumen bilaterally.      Pure Tone Thresholds assessed using conventional audiometry with good reliability from 250-8000 Hz bilaterally using insert earphones and circumaural headphones.      RIGHT:  Normal/borderline normal hearing  sloping to mild/moderate primarily sensorineural hearing hearing loss at 4000 Hz and above.     LEFT:  Normal/borderline normal hearing sloping to a mild to moderately severe primarily sensorineural hearing loss at 3000 Hz and above.   Asymmetric in high frequencies (left ear worse than right ear).  Did not retest Graciela since it was negative on 5/26/15.     Tympanogram:    RIGHT: Hypermobile     LEFT:  Slightly hypermobile  Consistent with previous results    Reflexes (reported by stimulus ear):  RIGHT: Ipsilateral is present at normal levels  RIGHT: Contralateral is present at normal levels  LEFT:   Ipsilateral is present at normal levels  LEFT:   Contralateral is present at elevated levels       Speech Reception Threshold:    RIGHT: 15 dB HL    LEFT:   15 dB HL  Word Recognition Score:     RIGHT: 100% at 60 dB HL using NU-6 recorded word list.    LEFT:  100% at 70 dB HL using NU-6 recorded word list.      ASSESSMENT:   Sensorineural hearing loss loss bilaterally, asymmetric     Compared to patient's previous audiogram dated 5/26/15, hearing in the right ear has decreased 10 dB at 500, 4000 and 8000 Hz and improved 10 dB at 6000 Hz.  Hearing in the left ear has decreased 10 dB at 500 and 4000 Hz.  Today s results were discussed with the patient in detail.     PLAN:    1. Patient was advised follow up with Dr. Nissen in ENT Clinic to review today's results due to the asymmetrical hearing loss.  2. Patient reports he is interested in a trial with hearing aids (borderline candidate).  Discussed that ENT could assess if he has medical clearance for hearing aid trial.    3. Patient should return for monitoring of his hearing every 1-2 or sooner as advised by ENT.      The patient expressed understanding and agreement with this plan.    Lukas Mccarthy, CCC-A, [facility]  Licensed Audiologist  [place] #[id number]    Enclosure: audiogram      Cc Fredy Joya MD   Cc Rick Nissen MD

## 2023-05-03 ENCOUNTER — OFFICE VISIT (OUTPATIENT)
Dept: AUDIOLOGY | Facility: CLINIC | Age: 73
End: 2023-05-03
Payer: COMMERCIAL

## 2023-05-03 DIAGNOSIS — H90.3 ASYMMETRICAL SENSORINEURAL HEARING LOSS: Primary | ICD-10-CM

## 2023-05-03 DIAGNOSIS — H91.90 DECREASED HEARING, UNSPECIFIED LATERALITY: ICD-10-CM

## 2023-05-03 PROCEDURE — 92550 TYMPANOMETRY & REFLEX THRESH: CPT | Performed by: AUDIOLOGIST-HEARING AID FITTER

## 2023-05-03 PROCEDURE — 92557 COMPREHENSIVE HEARING TEST: CPT | Performed by: AUDIOLOGIST-HEARING AID FITTER

## 2023-05-08 LAB — SCANNED LAB RESULT: NORMAL

## 2023-06-24 ENCOUNTER — HEALTH MAINTENANCE LETTER (OUTPATIENT)
Age: 73
End: 2023-06-24

## 2023-07-08 NOTE — PROGRESS NOTES
HPI:    Mr. Koehler comes in for a physical today. Overall doing well. He does not smoke nor abuse EtOH. He continues to exercise and is building a deck with his son-in-law. He needs Td/Tdap. He remains on his same medications. He states his BP is stable at home. No inguinal or abdominal hernias. No other HEENT, cardiopulmonary, abdominal, , neurological, systemic, psychiatric, lymphatic, endocrine, vascular complaints.     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 Grossly normal neurological exam.     Results for orders placed or performed in visit on 07/10/23   CBC with platelets and differential     Status: None   Result Value Ref Range    WBC Count 6.2 4.0 - 11.0 10e3/uL    RBC Count 5.21 4.40 - 5.90 10e6/uL    Hemoglobin 16.1 13.3 - 17.7 g/dL    Hematocrit 46.6 40.0 - 53.0 %    MCV 89 78 - 100 fL    MCH 30.9 26.5 - 33.0 pg    MCHC 34.5 31.5 - 36.5 g/dL    RDW 12.7 10.0 - 15.0 %    Platelet Count 192 150 - 450 10e3/uL    % Neutrophils 56 %    % Lymphocytes 31 %    % Monocytes 9 %    % Eosinophils 3 %    % Basophils 1 %    % Immature Granulocytes 0 %    NRBCs per 100 WBC 0 <1 /100    Absolute Neutrophils 3.5 1.6 - 8.3 10e3/uL    Absolute Lymphocytes 1.9 0.8 - 5.3 10e3/uL    Absolute Monocytes 0.5 0.0 - 1.3 10e3/uL    Absolute Eosinophils 0.2 0.0 - 0.7 10e3/uL    Absolute Basophils 0.0 0.0 - 0.2 10e3/uL    Absolute Immature Granulocytes 0.0 <=0.4 10e3/uL    Absolute NRBCs 0.0 10e3/uL   CBC with platelets and differential      Status: None    Narrative    The following orders were created for panel order CBC with platelets and differential.  Procedure                               Abnormality         Status                     ---------                               -----------         ------                     CBC with platelets and d...[[id number]]                      Final result                 Please view results for these tests on the individual orders.     A/P:     1. Immunizations; Tdap 7/25/2011 Pneumococcal 23 done 4/2/2014 Prevnar 13 done 4/29/2015. He has completed the  Shingrix vaccine series.  Pfizer COVID vaccine x 5 (bi-valent 9/6/2022). Recommended he get Td/Tdap outside pharmacy; he will go to [facility].   2. Increased lipids on Atorvastatin; 5/16/2022; , HDL 51 and TG's 127. Ordered labs today   3. HTN on Losartan and HcTz and ordered labs. 24 hour BP monitor done on 5/16/2022 was normal at 121/74.    4. PSA; on 11/16/2022 was 5.84. See Dr. Jara's Urology note from 4/12/2023. Prostate bx. From 4/5/2023 is positive for prostate cancer. He had a prostate MRI scan 1/2/2023  5. Colonoscopy; 11/12/2018 and repeat in 10 years   6. Dermatology. He would like to see Dermatology for skin cancer screening and placed referral today.   7. Audiology visit on 5/3/2023

## 2023-07-10 ENCOUNTER — LAB (OUTPATIENT)
Dept: LAB | Facility: CLINIC | Age: 73
End: 2023-07-10
Payer: COMMERCIAL

## 2023-07-10 ENCOUNTER — OFFICE VISIT (OUTPATIENT)
Dept: INTERNAL MEDICINE | Facility: CLINIC | Age: 73
End: 2023-07-10
Payer: COMMERCIAL

## 2023-07-10 VITALS
HEART RATE: 55 BPM | BODY MASS INDEX: 24.58 KG/M2 | HEIGHT: 70 IN | SYSTOLIC BLOOD PRESSURE: 144 MMHG | OXYGEN SATURATION: 95 % | WEIGHT: 171.7 LBS | DIASTOLIC BLOOD PRESSURE: 85 MMHG

## 2023-07-10 DIAGNOSIS — E78.00 HIGH BLOOD CHOLESTEROL: ICD-10-CM

## 2023-07-10 DIAGNOSIS — I10 BENIGN ESSENTIAL HYPERTENSION: ICD-10-CM

## 2023-07-10 DIAGNOSIS — Z12.83 SKIN CANCER SCREENING: ICD-10-CM

## 2023-07-10 DIAGNOSIS — Z12.83 SKIN CANCER SCREENING: Primary | ICD-10-CM

## 2023-07-10 LAB
ALBUMIN SERPL BCG-MCNC: 4.4 G/DL (ref 3.5–5.2)
ALP SERPL-CCNC: 84 U/L (ref 40–129)
ALT SERPL W P-5'-P-CCNC: 19 U/L (ref 0–70)
ANION GAP SERPL CALCULATED.3IONS-SCNC: 12 MMOL/L (ref 7–15)
AST SERPL W P-5'-P-CCNC: 26 U/L (ref 0–45)
BASOPHILS # BLD AUTO: 0 10E3/UL (ref 0–0.2)
BASOPHILS NFR BLD AUTO: 1 %
BILIRUB SERPL-MCNC: 1.3 MG/DL
BUN SERPL-MCNC: 22.2 MG/DL (ref 8–23)
CALCIUM SERPL-MCNC: 9.4 MG/DL (ref 8.8–10.2)
CHLORIDE SERPL-SCNC: 102 MMOL/L (ref 98–107)
CHOLEST SERPL-MCNC: 195 MG/DL
CREAT SERPL-MCNC: 0.94 MG/DL (ref 0.67–1.17)
DEPRECATED HCO3 PLAS-SCNC: 23 MMOL/L (ref 22–29)
EOSINOPHIL # BLD AUTO: 0.2 10E3/UL (ref 0–0.7)
EOSINOPHIL NFR BLD AUTO: 3 %
ERYTHROCYTE [DISTWIDTH] IN BLOOD BY AUTOMATED COUNT: 12.7 % (ref 10–15)
GFR SERPL CREATININE-BSD FRML MDRD: 87 ML/MIN/1.73M2
GLUCOSE SERPL-MCNC: 98 MG/DL (ref 70–99)
HCT VFR BLD AUTO: 46.6 % (ref 40–53)
HDLC SERPL-MCNC: 59 MG/DL
HGB BLD-MCNC: 16.1 G/DL (ref 13.3–17.7)
IMM GRANULOCYTES # BLD: 0 10E3/UL
IMM GRANULOCYTES NFR BLD: 0 %
LDLC SERPL CALC-MCNC: 117 MG/DL
LYMPHOCYTES # BLD AUTO: 1.9 10E3/UL (ref 0.8–5.3)
LYMPHOCYTES NFR BLD AUTO: 31 %
MCH RBC QN AUTO: 30.9 PG (ref 26.5–33)
MCHC RBC AUTO-ENTMCNC: 34.5 G/DL (ref 31.5–36.5)
MCV RBC AUTO: 89 FL (ref 78–100)
MONOCYTES # BLD AUTO: 0.5 10E3/UL (ref 0–1.3)
MONOCYTES NFR BLD AUTO: 9 %
NEUTROPHILS # BLD AUTO: 3.5 10E3/UL (ref 1.6–8.3)
NEUTROPHILS NFR BLD AUTO: 56 %
NONHDLC SERPL-MCNC: 136 MG/DL
NRBC # BLD AUTO: 0 10E3/UL
NRBC BLD AUTO-RTO: 0 /100
PLATELET # BLD AUTO: 192 10E3/UL (ref 150–450)
POTASSIUM SERPL-SCNC: 3.7 MMOL/L (ref 3.4–5.3)
PROT SERPL-MCNC: 7.1 G/DL (ref 6.4–8.3)
RBC # BLD AUTO: 5.21 10E6/UL (ref 4.4–5.9)
SODIUM SERPL-SCNC: 137 MMOL/L (ref 136–145)
TRIGL SERPL-MCNC: 96 MG/DL
WBC # BLD AUTO: 6.2 10E3/UL (ref 4–11)

## 2023-07-10 PROCEDURE — 80061 LIPID PANEL: CPT | Performed by: PATHOLOGY

## 2023-07-10 PROCEDURE — 99397 PER PM REEVAL EST PAT 65+ YR: CPT | Performed by: INTERNAL MEDICINE

## 2023-07-10 PROCEDURE — 85025 COMPLETE CBC W/AUTO DIFF WBC: CPT | Performed by: PATHOLOGY

## 2023-07-10 PROCEDURE — 36415 COLL VENOUS BLD VENIPUNCTURE: CPT | Performed by: PATHOLOGY

## 2023-07-10 PROCEDURE — 80053 COMPREHEN METABOLIC PANEL: CPT | Performed by: PATHOLOGY

## 2023-07-10 NOTE — NURSING NOTE
"Franky Koehler is a 72 year old male patient that presents today in clinic for the following:    Chief Complaint   Patient presents with     Physical     Dermatology referral.     The patient's allergies and medications were reviewed as noted. A set of vitals were recorded as noted without incident: BP (!) 144/85 (BP Location: Right arm, Patient Position: Sitting, Cuff Size: Adult Regular)   Pulse 55   Ht 1.778 m (5' 10\")   Wt 77.9 kg (171 lb 11.2 oz)   SpO2 95%   BMI 24.64 kg/m  . The patient does not have any other questions for the provider.    [place], EMT at 2:51 PM on 7/10/2023.  Primary care clinic: [phone]  "

## 2023-07-21 NOTE — OR NURSING
Coloscopy under conscious sedation.  No interventions.  Pt tolerated procedure.   Normal rate, regular rhythm.  Heart sounds S1, S2.  No murmurs, rubs or gallops.

## 2023-07-27 DIAGNOSIS — E78.00 HIGH BLOOD CHOLESTEROL: ICD-10-CM

## 2023-07-27 DIAGNOSIS — N52.8 OTHER MALE ERECTILE DYSFUNCTION: ICD-10-CM

## 2023-07-27 DIAGNOSIS — I10 BENIGN ESSENTIAL HYPERTENSION: ICD-10-CM

## 2023-07-31 NOTE — TELEPHONE ENCOUNTER
losartan (COZAAR) 50 MG tablet      Last Written Prescription Date:  2/23/23  Last Fill Quantity: 180,   # refills: 1  Last Office Visit : 7/10/23  Future Office visit:  none    Routing refill request to provider for review/approval because:  Failed protocol: BP > 140/90

## 2023-08-01 ENCOUNTER — TELEPHONE (OUTPATIENT)
Dept: INTERNAL MEDICINE | Facility: CLINIC | Age: 73
End: 2023-08-01
Payer: COMMERCIAL

## 2023-08-01 DIAGNOSIS — I10 BENIGN ESSENTIAL HYPERTENSION: ICD-10-CM

## 2023-08-01 DIAGNOSIS — E78.00 HIGH BLOOD CHOLESTEROL: ICD-10-CM

## 2023-08-01 DIAGNOSIS — I10 ESSENTIAL HYPERTENSION: ICD-10-CM

## 2023-08-01 RX ORDER — ATORVASTATIN CALCIUM 10 MG/1
10 TABLET, FILM COATED ORAL DAILY
Qty: 90 TABLET | Refills: 3 | Status: SHIPPED | OUTPATIENT
Start: 2023-08-01 | End: 2024-08-14

## 2023-08-01 RX ORDER — HYDROCHLOROTHIAZIDE 12.5 MG/1
12.5 TABLET ORAL DAILY
Qty: 90 TABLET | Refills: 3 | Status: SHIPPED | OUTPATIENT
Start: 2023-08-01 | End: 2024-08-14

## 2023-08-01 RX ORDER — LOSARTAN POTASSIUM 50 MG/1
100 TABLET ORAL DAILY
Qty: 180 TABLET | Refills: 3 | Status: SHIPPED | OUTPATIENT
Start: 2023-08-01 | End: 2024-08-14

## 2023-08-01 NOTE — TELEPHONE ENCOUNTER
[facility] Call Center    Phone Message    May a detailed message be left on voicemail: yes     Reason for Call: Medication Refill Request    Has the patient contacted the pharmacy for the refill? Yes   Name of medication being requested:   atorvastatin (LIPITOR) 10 MG tablet       Provider who prescribed the medication: PCP:   Fredy Joya MD   Pharmacy:   [facility] - [place], [place] - [address]     Date medication is needed: asap    Action Taken: Message routed to:  [facility] p[facility]    Travel Screening: Not Applicable

## 2023-08-01 NOTE — TELEPHONE ENCOUNTER
[facility] Call Center    Phone Message    May a detailed message be left on voicemail: yes     Reason for Call: Medication Refill Request    Has the patient contacted the pharmacy for the refill? Yes   Name of medication being requested: hydrochlorothiazide (HYDRODIURIL) 12.5 MG tablet   Provider who prescribed the medication: Mahnaz  Pharmacy:    [facility] ([facility]) [facility] - [place], [place] - [address]  Date medication is needed: 08/01/2023      Action Taken: Message routed to:  [facility]    Travel Screening: Not Applicable

## 2023-08-01 NOTE — TELEPHONE ENCOUNTER
atorvastatin (LIPITOR) 10 MG tablet 90 tablet 3 7/1/2022  No   Sig - Route: Take 1 tablet (10 mg) by mouth daily - Oral   7/10/2023  [facility] [place]     Fredy Joya MD  Internal Medicine   LDL done 7/10/23  Refilled per protocol.

## 2023-10-17 ENCOUNTER — LAB (OUTPATIENT)
Dept: LAB | Facility: CLINIC | Age: 73
End: 2023-10-17
Payer: COMMERCIAL

## 2023-10-17 DIAGNOSIS — C61 PROSTATE CANCER (H): ICD-10-CM

## 2023-10-17 LAB — PSA SERPL DL<=0.01 NG/ML-MCNC: 3.8 NG/ML (ref 0–6.5)

## 2023-10-17 PROCEDURE — 84153 ASSAY OF PSA TOTAL: CPT

## 2023-10-17 PROCEDURE — 36415 COLL VENOUS BLD VENIPUNCTURE: CPT

## 2024-05-08 ENCOUNTER — OFFICE VISIT (OUTPATIENT)
Dept: DERMATOLOGY | Facility: CLINIC | Age: 74
End: 2024-05-08
Payer: COMMERCIAL

## 2024-05-08 DIAGNOSIS — D22.39 FIBROUS PAPULE OF NOSE: ICD-10-CM

## 2024-05-08 DIAGNOSIS — L82.1 SK (SEBORRHEIC KERATOSIS): Primary | ICD-10-CM

## 2024-05-08 PROCEDURE — 99202 OFFICE O/P NEW SF 15 MIN: CPT | Performed by: DERMATOLOGY

## 2024-05-08 ASSESSMENT — PAIN SCALES - GENERAL: PAINLEVEL: NO PAIN (0)

## 2024-05-08 NOTE — LETTER
5/8/2024       RE: Franky Koehler  [address]  [place] [facility] [address]     Dear Colleague,    Thank you for referring your patient, Franky Koehler, to the [facility] [place] at [facility]. Please see a copy of my visit note below.    [facility] Dermatology Note  Encounter Date: May 8, 2024  Office Visit     Dermatology Problem List:  1. History of AK  2. SK  3. Fibrous papule   ____________________________________________    Assessment & Plan:    # Seborrheic keratosis, non irritated.   - Many, chronic. No treatment required     # fibrous papule, nasal tip  - Chronic, stable. No treatment required     Follow-up: 1 year(s) in-person, or earlier for new or changing lesions    Staff and Medical Student:     Matthieu Juan, M3  I was present with the medical student who participated in the service and in the documentation of the note. I have verified the history and personally performed the physical exam and medical decision making. I agree with the assessment and plan of care as documented in the note.  Tessa Corcoran MD     ____________________________________________    CC: Derm Problem (FBSE- no certain spots of concern)    HPI:  Mr. Franky Koehler is a(n) 73 year old male who presents today as a new patient for skin cancer screening.     Mr. Garnica was previously seen at [facility] in 2016 with Dr. Tolentino, now re-establishing care. Patient has not been seen by any outside dermatologist in the interim. Patient reports a extensive history of sun exposure - patient grew up in [place] and moved to [place] at age 20. Patient worked as a  for 15 years. Reports he has historically been inconsistent with sunscreen use, though became more diligent after having an AK frozen off around age 52. He endorses multiple instances of blistering sunburn when he was younger. Patient does not have any specifically concerning spots today.      Patient is otherwise feeling well, without additional skin concerns.    Labs:  None reviewed.    Physical Exam:  Vitals: There were no vitals taken for this visit.  SKIN: Full skin, which includes the head/face, both arms, chest, back, abdomen,both legs, genitalia and/or groin buttocks, digits and/or nails, was examined.  - Numerous waxy, stuck-on, flesh-colored to brown plaques scattered across the back and chest   - 4 mm homogenously blue macule on caudal back, consistent with dilated blood vessel   - 1-2 cm scaly macules noted on the left dorsal forearm, left ventral thigh  - 5mm tan, fibrous papule appreciated on the nose    - No other lesions of concern on areas examined.     Medications:  Current Outpatient Medications   Medication Sig Dispense Refill    atorvastatin (LIPITOR) 10 MG tablet Take 1 tablet (10 mg) by mouth daily 90 tablet 3    hydrochlorothiazide (HYDRODIURIL) 12.5 MG tablet Take 1 tablet (12.5 mg) by mouth daily 90 tablet 3    losartan (COZAAR) 50 MG tablet Take 2 tablets (100 mg) by mouth daily 180 tablet 3    sildenafil (VIAGRA) 50 MG tablet Take 1 tablet (50 mg) by mouth daily as needed 10 tablet 4     No current facility-administered medications for this visit.      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

## 2024-05-08 NOTE — PROGRESS NOTES
[facility] Dermatology Note  Encounter Date: May 8, 2024  Office Visit     Dermatology Problem List:  1. History of AK  2. SK  3. Fibrous papule   ____________________________________________    Assessment & Plan:    # Seborrheic keratosis, non irritated.   - Many, chronic. No treatment required     # fibrous papule, nasal tip  - Chronic, stable. No treatment required     Follow-up: 1 year(s) in-person, or earlier for new or changing lesions    Staff and Medical Student:     Matthieu Juan, M3  I was present with the medical student who participated in the service and in the documentation of the note. I have verified the history and personally performed the physical exam and medical decision making. I agree with the assessment and plan of care as documented in the note.  Tessa Corcoran MD     ____________________________________________    CC: Derm Problem (FBSE- no certain spots of concern)    HPI:  Mr. Franky Koehler is a(n) 73 year old male who presents today as a new patient for skin cancer screening.     Mr. Garnica was previously seen at [facility] in 2016 with Dr. Tolentino, now re-establishing care. Patient has not been seen by any outside dermatologist in the interim. Patient reports a extensive history of sun exposure - patient grew up in [place] and moved to [place] at age 20. Patient worked as a  for 15 years. Reports he has historically been inconsistent with sunscreen use, though became more diligent after having an AK frozen off around age 52. He endorses multiple instances of blistering sunburn when he was younger. Patient does not have any specifically concerning spots today.     Patient is otherwise feeling well, without additional skin concerns.    Labs:  None reviewed.    Physical Exam:  Vitals: There were no vitals taken for this visit.  SKIN: Full skin, which includes the head/face, both arms, chest, back, abdomen,both legs, genitalia and/or groin buttocks, digits and/or nails,  was examined.  - Numerous waxy, stuck-on, flesh-colored to brown plaques scattered across the back and chest   - 4 mm homogenously blue macule on caudal back, consistent with dilated blood vessel   - 1-2 cm scaly macules noted on the left dorsal forearm, left ventral thigh  - 5mm tan, fibrous papule appreciated on the nose    - No other lesions of concern on areas examined.     Medications:  Current Outpatient Medications   Medication Sig Dispense Refill    atorvastatin (LIPITOR) 10 MG tablet Take 1 tablet (10 mg) by mouth daily 90 tablet 3    hydrochlorothiazide (HYDRODIURIL) 12.5 MG tablet Take 1 tablet (12.5 mg) by mouth daily 90 tablet 3    losartan (COZAAR) 50 MG tablet Take 2 tablets (100 mg) by mouth daily 180 tablet 3    sildenafil (VIAGRA) 50 MG tablet Take 1 tablet (50 mg) by mouth daily as needed 10 tablet 4     No current facility-administered medications for this visit.      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

## 2024-05-08 NOTE — NURSING NOTE
Dermatology Rooming Note    Franky Koehler's goals for this visit include:   Chief Complaint   Patient presents with    Derm Problem     FBSE- no certain spots of concern     Kanika Montez, EMT

## 2024-05-08 NOTE — PATIENT INSTRUCTIONS
- No concerning findings on exam today  - Follow-up for repeat skin check in 1 year or sooner if new concerns arise

## 2024-06-07 ENCOUNTER — TELEPHONE (OUTPATIENT)
Dept: INTERNAL MEDICINE | Facility: CLINIC | Age: 74
End: 2024-06-07
Payer: COMMERCIAL

## 2024-06-07 DIAGNOSIS — I10 BENIGN ESSENTIAL HYPERTENSION: Primary | ICD-10-CM

## 2024-06-07 DIAGNOSIS — Z12.5 SCREENING FOR PROSTATE CANCER: ICD-10-CM

## 2024-06-16 ENCOUNTER — OFFICE VISIT (OUTPATIENT)
Dept: URGENT CARE | Facility: URGENT CARE | Age: 74
End: 2024-06-16
Payer: COMMERCIAL

## 2024-06-16 VITALS
OXYGEN SATURATION: 97 % | RESPIRATION RATE: 16 BRPM | HEART RATE: 51 BPM | SYSTOLIC BLOOD PRESSURE: 164 MMHG | BODY MASS INDEX: 24.39 KG/M2 | TEMPERATURE: 97.8 F | DIASTOLIC BLOOD PRESSURE: 84 MMHG | WEIGHT: 170 LBS

## 2024-06-16 DIAGNOSIS — L03.314 CELLULITIS OF GROIN: Primary | ICD-10-CM

## 2024-06-16 PROCEDURE — 99214 OFFICE O/P EST MOD 30 MIN: CPT | Performed by: FAMILY MEDICINE

## 2024-06-16 RX ORDER — MUPIROCIN 20 MG/G
OINTMENT TOPICAL 3 TIMES DAILY
Qty: 22 G | Refills: 0 | Status: SHIPPED | OUTPATIENT
Start: 2024-06-16

## 2024-06-16 RX ORDER — DOXYCYCLINE HYCLATE 100 MG
100 TABLET ORAL 2 TIMES DAILY
Qty: 20 TABLET | Refills: 0 | Status: SHIPPED | OUTPATIENT
Start: 2024-06-16 | End: 2024-06-26

## 2024-06-16 ASSESSMENT — PAIN SCALES - GENERAL: PAINLEVEL: MILD PAIN (3)

## 2024-06-16 NOTE — PATIENT INSTRUCTIONS
Start doxycycline 2 times a day for 10 days always take with food to prevent nausea vomiting for your cellulitis     Start Bactroban (mupirocin) ointment over the sore near the center of the wound    If fever, the redness increases and spreads past the the area outlined or extends into your groin or scrotum to the ER      Follow up with your primary care provider or clinic in about 2-3 days  if your symptoms do not improve

## 2024-06-16 NOTE — PROGRESS NOTES
ASSESSMENT/PLAN:      ICD-10-CM    1. Cellulitis of groin  L03.314 doxycycline hyclate (VIBRA-TABS) 100 MG tablet     mupirocin (BACTROBAN) 2 % external ointment    right border of inguinal fold-initially pruritic rash,now incrase in size,erythematous, tender indurated, mild pruritic-cellulitis most likely due to scratching          Patient Instructions     Start doxycycline 2 times a day for 10 days always take with food to prevent nausea vomiting for your cellulitis     Start Bactroban (mupirocin) ointment over the sore near the center of the wound    If fever, the redness increases and spreads past the the area outlined or extends into your groin or scrotum to the ER      Follow up with your primary care provider or clinic in about 2-3 days  if your symptoms do not improve                    Reviewed medication instructions and side effects. Follow up if experiences side effects.     I reviewed supportive care, otc meds to use if needed, expected course, and signs of concern.  Follow up as needed or if he does not improve within  1-2 days or if worsens in any way.  Reviewed red flag symptoms and is to go to the ER if experiences any of these.     The use of Dragon/[facility]` dictation services may have been used to construct the content in this note; any grammatical or spelling errors are non-intentional. Please contact the author of this note directly if you are in need of any clarification.                  Patient presents with:  Urgent Care  Derm Problem: Tender rash in right groin for 5 days       Subjective     Franky Koehler is a 73 year old male who presents to clinic today for the following health issues:    HPI   Patient with onset 5 days ago of a pruritic rash in his right groin area  Now area has become tender, erythematous and painful  No history of similar in the past, no fever, ? 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      Social History     Tobacco Use    Smoking status: Former     Current packs/day: 0.00     Average packs/day: 1.5 packs/day for 30.0 years (45.0 ttl pk-yrs)     Types: Cigarettes     Start date: 1972     Quit date: 2002     Years since quittin.8    Smokeless tobacco: Never   Substance Use Topics    Alcohol use: Yes     Alcohol/week: 0.0 standard drinks of alcohol     Comment: 12 per week       Current Outpatient Medications   Medication Sig Dispense Refill    doxycycline hyclate (VIBRA-TABS) 100 MG tablet Take 1 tablet (100 mg) by mouth 2 times daily for 10 days 20 tablet 0    mupirocin (BACTROBAN) 2 % external ointment Apply topically 3 times daily 22 g 0    atorvastatin (LIPITOR) 10 MG tablet Take 1 tablet (10 mg) by mouth daily 90 tablet 3    hydrochlorothiazide (HYDRODIURIL) 12.5 MG tablet Take 1 tablet (12.5 mg) by mouth daily 90 tablet 3    losartan (COZAAR) 50 MG tablet Take 2 tablets (100 mg) by mouth daily 180 tablet 3    sildenafil (VIAGRA) 50 MG tablet Take 1 tablet (50 mg) by mouth daily as needed 10 tablet 4     Allergies   Allergen Reactions    No Known Drug Allergy              ROS are negative, except as otherwise noted HPI      Objective    BP (!) 164/84   Pulse 51   Temp 97.8  F (36.6  C) (Tympanic)   Resp 16   Wt 77.1 kg (170 lb)   SpO2 97%   BMI 24.39 kg/m    Body mass index is 24.39 kg/m .  Physical Exam   GENERAL: alert and no distress  MS: no gross musculoskeletal defects noted, no edema  SKIN: Patient with a large indurated, erythematous,  tender area extending from the border of the right inguinal  fold extends distal to the the upper portion of the right thigh and lateral to the ust to the border of the hip, small excoriated patch in the central portion of the large patch, no fluctuance no drainage no crusting  No inguinal adenopathy, no extension of erythema into the inguinal fold or into the scrotum nontender no erythema bilateral testicles nontender bilateral  Right hip-non tender over the greater trochanter, no pain in the hip joint with ROM, Pain in area of soft tissue erythema bordering the hip with range of motion of hip  NEURO: Baseline strength and tone, mentation intact and speech normal, baseline gait      Diagnostic Test Results:  Labs reviewed in Epic  No results found for any visits on 06/16/24.

## 2024-07-22 ENCOUNTER — LAB (OUTPATIENT)
Dept: LAB | Facility: CLINIC | Age: 74
End: 2024-07-22
Payer: COMMERCIAL

## 2024-07-22 DIAGNOSIS — I10 BENIGN ESSENTIAL HYPERTENSION: ICD-10-CM

## 2024-07-22 DIAGNOSIS — Z12.5 SCREENING FOR PROSTATE CANCER: ICD-10-CM

## 2024-07-22 LAB
ALBUMIN SERPL BCG-MCNC: 4.4 G/DL (ref 3.5–5.2)
ALP SERPL-CCNC: 90 U/L (ref 40–150)
ALT SERPL W P-5'-P-CCNC: 24 U/L (ref 0–70)
ANION GAP SERPL CALCULATED.3IONS-SCNC: 10 MMOL/L (ref 7–15)
AST SERPL W P-5'-P-CCNC: 30 U/L (ref 0–45)
BASOPHILS # BLD AUTO: 0 10E3/UL (ref 0–0.2)
BASOPHILS NFR BLD AUTO: 1 %
BILIRUB SERPL-MCNC: 1.2 MG/DL
BUN SERPL-MCNC: 14.5 MG/DL (ref 8–23)
CALCIUM SERPL-MCNC: 9.1 MG/DL (ref 8.8–10.4)
CHLORIDE SERPL-SCNC: 102 MMOL/L (ref 98–107)
CHOLEST SERPL-MCNC: 176 MG/DL
CREAT SERPL-MCNC: 0.88 MG/DL (ref 0.67–1.17)
EGFRCR SERPLBLD CKD-EPI 2021: >90 ML/MIN/1.73M2
EOSINOPHIL # BLD AUTO: 0.2 10E3/UL (ref 0–0.7)
EOSINOPHIL NFR BLD AUTO: 5 %
ERYTHROCYTE [DISTWIDTH] IN BLOOD BY AUTOMATED COUNT: 12.9 % (ref 10–15)
FASTING STATUS PATIENT QL REPORTED: ABNORMAL
FASTING STATUS PATIENT QL REPORTED: NORMAL
GLUCOSE SERPL-MCNC: 108 MG/DL (ref 70–99)
HCO3 SERPL-SCNC: 26 MMOL/L (ref 22–29)
HCT VFR BLD AUTO: 46.4 % (ref 40–53)
HDLC SERPL-MCNC: 52 MG/DL
HGB BLD-MCNC: 15.7 G/DL (ref 13.3–17.7)
IMM GRANULOCYTES # BLD: 0 10E3/UL
IMM GRANULOCYTES NFR BLD: 0 %
LDLC SERPL CALC-MCNC: 99 MG/DL
LYMPHOCYTES # BLD AUTO: 1.8 10E3/UL (ref 0.8–5.3)
LYMPHOCYTES NFR BLD AUTO: 35 %
MCH RBC QN AUTO: 31.5 PG (ref 26.5–33)
MCHC RBC AUTO-ENTMCNC: 33.8 G/DL (ref 31.5–36.5)
MCV RBC AUTO: 93 FL (ref 78–100)
MONOCYTES # BLD AUTO: 0.5 10E3/UL (ref 0–1.3)
MONOCYTES NFR BLD AUTO: 9 %
NEUTROPHILS # BLD AUTO: 2.6 10E3/UL (ref 1.6–8.3)
NEUTROPHILS NFR BLD AUTO: 50 %
NONHDLC SERPL-MCNC: 124 MG/DL
PLATELET # BLD AUTO: 183 10E3/UL (ref 150–450)
POTASSIUM SERPL-SCNC: 4.3 MMOL/L (ref 3.4–5.3)
PROT SERPL-MCNC: 6.9 G/DL (ref 6.4–8.3)
PSA SERPL DL<=0.01 NG/ML-MCNC: 5.14 NG/ML (ref 0–6.5)
RBC # BLD AUTO: 4.98 10E6/UL (ref 4.4–5.9)
SODIUM SERPL-SCNC: 138 MMOL/L (ref 135–145)
TRIGL SERPL-MCNC: 124 MG/DL
WBC # BLD AUTO: 5.2 10E3/UL (ref 4–11)

## 2024-07-22 PROCEDURE — 36415 COLL VENOUS BLD VENIPUNCTURE: CPT

## 2024-07-22 PROCEDURE — 85025 COMPLETE CBC W/AUTO DIFF WBC: CPT

## 2024-07-22 PROCEDURE — 80053 COMPREHEN METABOLIC PANEL: CPT

## 2024-07-22 PROCEDURE — 80061 LIPID PANEL: CPT

## 2024-07-22 PROCEDURE — G0103 PSA SCREENING: HCPCS

## 2024-08-06 ENCOUNTER — PRE VISIT (OUTPATIENT)
Dept: UROLOGY | Facility: CLINIC | Age: 74
End: 2024-08-06
Payer: COMMERCIAL

## 2024-08-07 NOTE — TELEPHONE ENCOUNTER
Reason for visit: follow up     Relevant information: elevated PSA    Records/imaging/labs/orders: most recent PSA 7/22/24    Pt called: No need for a call    At Rooming: virtual visit    Gavin Hope  8/6/2024  10:01 PM

## 2024-08-14 DIAGNOSIS — E78.00 HIGH BLOOD CHOLESTEROL: ICD-10-CM

## 2024-08-14 DIAGNOSIS — I10 BENIGN ESSENTIAL HYPERTENSION: ICD-10-CM

## 2024-08-14 DIAGNOSIS — N52.8 OTHER MALE ERECTILE DYSFUNCTION: ICD-10-CM

## 2024-08-14 DIAGNOSIS — I10 ESSENTIAL HYPERTENSION: ICD-10-CM

## 2024-08-14 RX ORDER — LOSARTAN POTASSIUM 50 MG/1
100 TABLET ORAL DAILY
Qty: 180 TABLET | Refills: 0 | Status: SHIPPED | OUTPATIENT
Start: 2024-08-14

## 2024-08-14 RX ORDER — ATORVASTATIN CALCIUM 10 MG/1
10 TABLET, FILM COATED ORAL DAILY
Qty: 30 TABLET | Refills: 1 | Status: SHIPPED | OUTPATIENT
Start: 2024-08-14

## 2024-08-14 RX ORDER — HYDROCHLOROTHIAZIDE 12.5 MG/1
12.5 TABLET ORAL DAILY
Qty: 30 TABLET | Refills: 1 | Status: SHIPPED | OUTPATIENT
Start: 2024-08-14

## 2024-08-14 NOTE — TELEPHONE ENCOUNTER
[facility] Call Center    Phone Message    May a detailed message be left on voicemail: yes     Reason for Call: Medication Refill Request    Has the patient contacted the pharmacy for the refill? Yes   Name of medication being requested: losartan (COZAAR) 50 MG tablet  and  atorvastatin (LIPITOR) 10 MG tablet  and  hydrochlorothiazide (HYDRODIURIL) 12.5 MG tablet  Provider who prescribed the medication:   Pharmacy: [facility] - [place], [place] - [address]   Date medication is needed: ASAP         Action Taken: Message routed to:  [facility]    Travel Screening: Not Applicable     Date of Service:

## 2024-08-14 NOTE — TELEPHONE ENCOUNTER
Medication Requested:  losartan (COZAAR) 50 MG tablet 180 tablet 3 8/1/2023 -- No   Sig - Route: Take 2 tablets (100 mg) by mouth daily - Oral     atorvastatin (LIPITOR) 10 MG tablet 90 tablet 3 8/1/2023 -- No   Sig - Route: Take 1 tablet (10 mg) by mouth daily - Oral     hydrochlorothiazide (HYDRODIURIL) 12.5 MG tablet 90 tablet 3 8/1/2023 -- No   Sig - Route: Take 1 tablet (12.5 mg) by mouth daily - Oral     ----------------------  Last Office Visit : 7/10/2023  [facility] Office visit:    11/25/2024 10:00 AM (30 min)  [place]    Arrive by:  9:45 AM   [facility] PHYSICAL   [facility] ([facility])   Fredy Joya MD     ----------------------        Refill decision: Refill pended and routed to the provider for review/determination due to the following criteria not met:   Losartan: BP elevated >140/90, pended for 3 mos. Sent to care team per protocol.      Lipitor, Hydrodiuril: overdue for annual follow-up, appt scheduled in Nov. 30 day +1 josue refills given.    Pass/Fail Protocol Criteria:  BP Readings from Last 3 Encounters:   06/16/24 (!) 164/84   07/10/23 (!) 144/85   04/05/23 134/84   Last Comprehensive Metabolic Panel:  Lab Results   Component Value Date     07/22/2024    POTASSIUM 4.3 07/22/2024    CHLORIDE 102 07/22/2024    CO2 26 07/22/2024    ANIONGAP 10 07/22/2024     (H) 07/22/2024    BUN 14.5 07/22/2024    CR 0.88 07/22/2024    GFRESTIMATED >90 07/22/2024    LISA 9.1 07/22/2024       LDL Cholesterol Calculated   Date Value Ref Range Status   07/22/2024 99 <=100 mg/dL Final   03/23/2021 96 <100 mg/dL Final     Comment:     Desirable:       <100 mg/dl

## 2024-08-17 ENCOUNTER — HEALTH MAINTENANCE LETTER (OUTPATIENT)
Age: 74
End: 2024-08-17

## 2024-08-21 ENCOUNTER — VIRTUAL VISIT (OUTPATIENT)
Dept: UROLOGY | Facility: CLINIC | Age: 74
End: 2024-08-21
Payer: COMMERCIAL

## 2024-08-21 VITALS — WEIGHT: 170 LBS | HEIGHT: 70 IN | BODY MASS INDEX: 24.34 KG/M2

## 2024-08-21 DIAGNOSIS — C61 PROSTATE CANCER (H): Primary | ICD-10-CM

## 2024-08-21 PROCEDURE — 99214 OFFICE O/P EST MOD 30 MIN: CPT | Mod: 95 | Performed by: UROLOGY

## 2024-08-21 ASSESSMENT — PAIN SCALES - GENERAL: PAINLEVEL: NO PAIN (0)

## 2024-08-21 NOTE — PROGRESS NOTES
"Virtual Visit Details    Type of service:  Video Visit   Video Start Time:  10:15 AM  Video End Time: 10:30 AM    Originating Location (pt. Location): Home    Distant Location (provider location):  On-site  Platform used for Video Visit: [facility]      Urology Clinic     HPI  Franky Koehler is a 73 year old male with history of low-volume intermediate risk prostate cancer on active surveillance, here for follow-up.      The patient denies any significant changes to health, hospitalizations or new diagnoses in the interim    PHYSICAL EXAM  Vitals:  No vitals were obtained today due to virtual visit.    Physical Exam   EYES: Eyes grossly normal to inspection.  No discharge or erythema, or obvious scleral/conjunctival abnormalities.  SKIN: Visible skin clear. No significant rash, abnormal pigmentation or lesions.  NEURO: Cranial nerves grossly intact.  Mentation and speech appropriate for age.  GENERAL: Healthy, alert and no distress  RESP: No audible wheeze, cough, or visible cyanosis.  No visible retractions or increased work of breathing.    PSYCH: Mentation appears normal, affect normal/bright, judgement and insight intact, normal speech and appearance well-groomed.      Labs  Lab Results   Component Value Date    WBC 5.2 07/22/2024    WBC 6.6 03/23/2021     Lab Results   Component Value Date    RBC 4.98 07/22/2024    RBC 5.16 03/23/2021     Lab Results   Component Value Date    HGB 15.7 07/22/2024    HGB 16.2 03/23/2021     Lab Results   Component Value Date    HCT 46.4 07/22/2024    HCT 48.6 03/23/2021     No components found for: \"MCT\"  Lab Results   Component Value Date    MCV 93 07/22/2024    MCV 94 03/23/2021     Lab Results   Component Value Date    MCH 31.5 07/22/2024    MCH 31.4 03/23/2021     Lab Results   Component Value Date    MCHC 33.8 07/22/2024    MCHC 33.3 03/23/2021     Lab Results   Component Value Date    RDW 12.9 07/22/2024    RDW 13.1 03/23/2021     Lab Results   Component Value Date     " 07/22/2024     03/23/2021        Last Comprehensive Metabolic Panel:  Sodium   Date Value Ref Range Status   07/22/2024 138 135 - 145 mmol/L Final   03/23/2021 137 133 - 144 mmol/L Final     Potassium   Date Value Ref Range Status   07/22/2024 4.3 3.4 - 5.3 mmol/L Final   05/16/2022 3.8 3.4 - 5.3 mmol/L Final   03/23/2021 3.8 3.4 - 5.3 mmol/L Final     Chloride   Date Value Ref Range Status   07/22/2024 102 98 - 107 mmol/L Final   05/16/2022 107 94 - 109 mmol/L Final   03/23/2021 105 94 - 109 mmol/L Final     Carbon Dioxide   Date Value Ref Range Status   03/23/2021 28 20 - 32 mmol/L Final     Carbon Dioxide (CO2)   Date Value Ref Range Status   07/22/2024 26 22 - 29 mmol/L Final   05/16/2022 28 20 - 32 mmol/L Final     Anion Gap   Date Value Ref Range Status   07/22/2024 10 7 - 15 mmol/L Final   05/16/2022 6 3 - 14 mmol/L Final   03/23/2021 4 3 - 14 mmol/L Final     Glucose   Date Value Ref Range Status   07/22/2024 108 (H) 70 - 99 mg/dL Final   05/16/2022 104 (H) 70 - 99 mg/dL Final   03/23/2021 93 70 - 99 mg/dL Final     Urea Nitrogen   Date Value Ref Range Status   07/22/2024 14.5 8.0 - 23.0 mg/dL Final   05/16/2022 22 7 - 30 mg/dL Final   03/23/2021 17 7 - 30 mg/dL Final     Creatinine   Date Value Ref Range Status   07/22/2024 0.88 0.67 - 1.17 mg/dL Final   03/23/2021 0.77 0.66 - 1.25 mg/dL Final     GFR Estimate   Date Value Ref Range Status   07/22/2024 >90 >60 mL/min/1.73m2 Final     Comment:     eGFR calculated using 2021 CKD-EPI equation.   03/23/2021 >90 >60 mL/min/[1.73_m2] Final     Comment:     Non  GFR Calc  Starting 12/18/2018, serum creatinine based estimated GFR (eGFR) will be   calculated using the Chronic Kidney Disease Epidemiology Collaboration   (CKD-EPI) equation.       Calcium   Date Value Ref Range Status   07/22/2024 9.1 8.8 - 10.4 mg/dL Final     Comment:     Reference intervals for this test were updated on 7/16/2024 to reflect our healthy population more  accurately. There may be differences in the flagging of prior results with similar values performed with this method. Those prior results can be interpreted in the context of the updated reference intervals.   03/23/2021 9.2 8.5 - 10.1 mg/dL Final     Bilirubin Total   Date Value Ref Range Status   07/22/2024 1.2 <=1.2 mg/dL Final   03/23/2021 1.4 (H) 0.2 - 1.3 mg/dL Final     Alkaline Phosphatase   Date Value Ref Range Status   07/22/2024 90 40 - 150 U/L Final   03/23/2021 90 40 - 150 U/L Final     ALT   Date Value Ref Range Status   07/22/2024 24 0 - 70 U/L Final   03/23/2021 33 0 - 70 U/L Final     AST   Date Value Ref Range Status   07/22/2024 30 0 - 45 U/L Final   03/23/2021 23 0 - 45 U/L Final       PSA   Date Value Ref Range Status   08/25/2020 2.90 0 - 4 ug/L Final     Comment:     Assay Method:  Chemiluminescence using Siemens Vista analyzer   06/18/2019 2.21 0 - 4 ug/L Final     Comment:     Assay Method:  Chemiluminescence using Siemens Vista analyzer   06/19/2017 2.04 0 - 4 ug/L Final     Comment:     Assay Method:  Chemiluminescence using Siemens Vista analyzer   05/03/2016 1.78 0 - 4 ug/L Final   04/29/2015 1.81 0 - 4 ug/L Final   05/05/2014 1.19 0 - 4 ug/L Final     Comment:     PSA results are about 7% lower than our prior method due to a methodology   change   on August 30, 2011.     10/19/2012 1.29 0 - 4 ug/L Final     Comment:     PSA results are about 7% lower than our prior method due to a methodology   change   on August 30, 2011.     Prostate Specific Antigen Screen   Date Value Ref Range Status   07/22/2024 5.14 0.00 - 6.50 ng/mL Final   11/16/2022 5.84 0.00 - 6.50 ng/mL Final   05/16/2022 3.71 0.00 - 4.00 ug/L Final     PSA Tumor Marker   Date Value Ref Range Status   10/17/2023 3.80 0.00 - 6.50 ng/mL Final          ASSESSMENT AND PLAN  73 year old male for followup of low-volume intermediate risk prostate cancer on active surveillance diagnosed on 3/24/2023 doing well stable PSA      Plan    Repeat MR prostate in November  Scheduled for fusion versus regular prostate biopsy in December as part of active surveillance protocol      30 total minutes spent on the date of the encounter including direct interaction with the patient, performing chart review, documentation and further activities as noted above    Bhavesh Jara MD   Department of Urology   [facility]

## 2024-08-21 NOTE — LETTER
"8/21/2024       RE: Franky Koehler  [address]  [place] [facility] [address]     Dear Colleague,    Thank you for referring your patient, Franky Koehler, to the [facility] [place] at [facility]. Please see a copy of my visit note below.    Virtual Visit Details    Type of service:  Video Visit   Video Start Time:  10:15 AM  Video End Time: 10:30 AM    Originating Location (pt. Location): Home    Distant Location (provider location):  On-site  Platform used for Video Visit: [facility]      Urology Clinic     [facility]  Franky Koehler is a 73 year old male with history of low-volume intermediate risk prostate cancer on active surveillance, here for follow-up.      The patient denies any significant changes to health, hospitalizations or new diagnoses in the interim    PHYSICAL EXAM  Vitals:  No vitals were obtained today due to virtual visit.    Physical Exam   EYES: Eyes grossly normal to inspection.  No discharge or erythema, or obvious scleral/conjunctival abnormalities.  SKIN: Visible skin clear. No significant rash, abnormal pigmentation or lesions.  NEURO: Cranial nerves grossly intact.  Mentation and speech appropriate for age.  GENERAL: Healthy, alert and no distress  RESP: No audible wheeze, cough, or visible cyanosis.  No visible retractions or increased work of breathing.    PSYCH: Mentation appears normal, affect normal/bright, judgement and insight intact, normal speech and appearance well-groomed.      Labs  Lab Results   Component Value Date    WBC 5.2 07/22/2024    WBC 6.6 03/23/2021     Lab Results   Component Value Date    RBC 4.98 07/22/2024    RBC 5.16 03/23/2021     Lab Results   Component Value Date    HGB 15.7 07/22/2024    HGB 16.2 03/23/2021     Lab Results   Component Value Date    HCT 46.4 07/22/2024    HCT 48.6 03/23/2021     No components found for: \"MCT\"  Lab Results   Component Value Date    MCV 93 07/22/2024    MCV 94 03/23/2021 "     Lab Results   Component Value Date    MCH 31.5 07/22/2024    MCH 31.4 03/23/2021     Lab Results   Component Value Date    MCHC 33.8 07/22/2024    MCHC 33.3 03/23/2021     Lab Results   Component Value Date    RDW 12.9 07/22/2024    RDW 13.1 03/23/2021     Lab Results   Component Value Date     07/22/2024     03/23/2021        Last Comprehensive Metabolic Panel:  Sodium   Date Value Ref Range Status   07/22/2024 138 135 - 145 mmol/L Final   03/23/2021 137 133 - 144 mmol/L Final     Potassium   Date Value Ref Range Status   07/22/2024 4.3 3.4 - 5.3 mmol/L Final   05/16/2022 3.8 3.4 - 5.3 mmol/L Final   03/23/2021 3.8 3.4 - 5.3 mmol/L Final     Chloride   Date Value Ref Range Status   07/22/2024 102 98 - 107 mmol/L Final   05/16/2022 107 94 - 109 mmol/L Final   03/23/2021 105 94 - 109 mmol/L Final     Carbon Dioxide   Date Value Ref Range Status   03/23/2021 28 20 - 32 mmol/L Final     Carbon Dioxide (CO2)   Date Value Ref Range Status   07/22/2024 26 22 - 29 mmol/L Final   05/16/2022 28 20 - 32 mmol/L Final     Anion Gap   Date Value Ref Range Status   07/22/2024 10 7 - 15 mmol/L Final   05/16/2022 6 3 - 14 mmol/L Final   03/23/2021 4 3 - 14 mmol/L Final     Glucose   Date Value Ref Range Status   07/22/2024 108 (H) 70 - 99 mg/dL Final   05/16/2022 104 (H) 70 - 99 mg/dL Final   03/23/2021 93 70 - 99 mg/dL Final     Urea Nitrogen   Date Value Ref Range Status   07/22/2024 14.5 8.0 - 23.0 mg/dL Final   05/16/2022 22 7 - 30 mg/dL Final   03/23/2021 17 7 - 30 mg/dL Final     Creatinine   Date Value Ref Range Status   07/22/2024 0.88 0.67 - 1.17 mg/dL Final   03/23/2021 0.77 0.66 - 1.25 mg/dL Final     GFR Estimate   Date Value Ref Range Status   07/22/2024 >90 >60 mL/min/1.73m2 Final     Comment:     eGFR calculated using 2021 CKD-EPI equation.   03/23/2021 >90 >60 mL/min/[1.73_m2] Final     Comment:     Non  GFR Calc  Starting 12/18/2018, serum creatinine based estimated GFR (eGFR) will  be   calculated using the Chronic Kidney Disease Epidemiology Collaboration   (CKD-EPI) equation.       Calcium   Date Value Ref Range Status   07/22/2024 9.1 8.8 - 10.4 mg/dL Final     Comment:     Reference intervals for this test were updated on 7/16/2024 to reflect our healthy population more accurately. There may be differences in the flagging of prior results with similar values performed with this method. Those prior results can be interpreted in the context of the updated reference intervals.   03/23/2021 9.2 8.5 - 10.1 mg/dL Final     Bilirubin Total   Date Value Ref Range Status   07/22/2024 1.2 <=1.2 mg/dL Final   03/23/2021 1.4 (H) 0.2 - 1.3 mg/dL Final     Alkaline Phosphatase   Date Value Ref Range Status   07/22/2024 90 40 - 150 U/L Final   03/23/2021 90 40 - 150 U/L Final     ALT   Date Value Ref Range Status   07/22/2024 24 0 - 70 U/L Final   03/23/2021 33 0 - 70 U/L Final     AST   Date Value Ref Range Status   07/22/2024 30 0 - 45 U/L Final   03/23/2021 23 0 - 45 U/L Final       PSA   Date Value Ref Range Status   08/25/2020 2.90 0 - 4 ug/L Final     Comment:     Assay Method:  Chemiluminescence using Siemens Vista analyzer   06/18/2019 2.21 0 - 4 ug/L Final     Comment:     Assay Method:  Chemiluminescence using Siemens Vista analyzer   06/19/2017 2.04 0 - 4 ug/L Final     Comment:     Assay Method:  Chemiluminescence using Siemens Vista analyzer   05/03/2016 1.78 0 - 4 ug/L Final   04/29/2015 1.81 0 - 4 ug/L Final   05/05/2014 1.19 0 - 4 ug/L Final     Comment:     PSA results are about 7% lower than our prior method due to a methodology   change   on August 30, 2011.     10/19/2012 1.29 0 - 4 ug/L Final     Comment:     PSA results are about 7% lower than our prior method due to a methodology   change   on August 30, 2011.     Prostate Specific Antigen Screen   Date Value Ref Range Status   07/22/2024 5.14 0.00 - 6.50 ng/mL Final   11/16/2022 5.84 0.00 - 6.50 ng/mL Final   05/16/2022 3.71 0.00  - 4.00 ug/L Final     PSA Tumor Marker   Date Value Ref Range Status   10/17/2023 3.80 0.00 - 6.50 ng/mL Final          ASSESSMENT AND PLAN  73 year old male for followup of low-volume intermediate risk prostate cancer on active surveillance diagnosed on 3/24/2023 doing well stable PSA      Plan   Repeat MR prostate in November  Scheduled for fusion versus regular prostate biopsy in December as part of active surveillance protocol      30 total minutes spent on the date of the encounter including direct interaction with the patient, performing chart review, documentation and further activities as noted above    Bhavesh Jara MD   Department of Urology   [facility]                   Again, thank you for allowing me to participate in the care of your patient.      Sincerely,    Bhavesh Jara MD

## 2024-11-01 DIAGNOSIS — I10 ESSENTIAL HYPERTENSION: ICD-10-CM

## 2024-11-01 DIAGNOSIS — I10 BENIGN ESSENTIAL HYPERTENSION: ICD-10-CM

## 2024-11-01 DIAGNOSIS — N52.8 OTHER MALE ERECTILE DYSFUNCTION: ICD-10-CM

## 2024-11-01 DIAGNOSIS — E78.00 HIGH BLOOD CHOLESTEROL: ICD-10-CM

## 2024-11-04 NOTE — TELEPHONE ENCOUNTER
atorvastatin (LIPITOR) 10 MG tablet 30 tablet 1 8/14/2024     hydroCHLOROthiazide 12.5 MG tablet 30 tablet 1 8/14/2024     losartan (COZAAR) 50 MG tablet 180 tablet 0 8/14/2024     Last Office Visit : 7/10/24  Future Office visit:  11/25/24    Routing refill request to provider for review/approval because:  Blood pressure out of range       BP Readings from Last 3 Encounters:   06/16/24 (!) 164/84   07/10/23 (!) 144/85   04/05/23 134/84

## 2024-11-05 RX ORDER — ATORVASTATIN CALCIUM 10 MG/1
10 TABLET, FILM COATED ORAL DAILY
Qty: 90 TABLET | Refills: 0 | Status: SHIPPED | OUTPATIENT
Start: 2024-11-05

## 2024-11-05 RX ORDER — HYDROCHLOROTHIAZIDE 12.5 MG/1
12.5 TABLET ORAL DAILY
Qty: 90 TABLET | Refills: 0 | Status: SHIPPED | OUTPATIENT
Start: 2024-11-05

## 2024-11-05 RX ORDER — LOSARTAN POTASSIUM 50 MG/1
100 TABLET ORAL DAILY
Qty: 180 TABLET | Refills: 0 | Status: SHIPPED | OUTPATIENT
Start: 2024-11-05

## 2024-11-22 SDOH — HEALTH STABILITY: PHYSICAL HEALTH: ON AVERAGE, HOW MANY MINUTES DO YOU ENGAGE IN EXERCISE AT THIS LEVEL?: 60 MIN

## 2024-11-22 SDOH — HEALTH STABILITY: PHYSICAL HEALTH: ON AVERAGE, HOW MANY DAYS PER WEEK DO YOU ENGAGE IN MODERATE TO STRENUOUS EXERCISE (LIKE A BRISK WALK)?: 2 DAYS

## 2024-11-22 ASSESSMENT — SOCIAL DETERMINANTS OF HEALTH (SDOH): HOW OFTEN DO YOU GET TOGETHER WITH FRIENDS OR RELATIVES?: MORE THAN THREE TIMES A WEEK

## 2024-11-24 NOTE — PROGRESS NOTES
HPI:    Mr. Koehler comes in for a physical today. Overall doing well. He continues to exercise. He has had two 24 hour BP monitors that were in normal limits. He remains on Losartan and HcTz for increased BP.  He does not smoke nor abuse EtOH. No other HEENT, cardiopulmonary, abdominal, , neurological, systemic, psychiatric, lymphatic, endocrine, vascular complaints. He does use Vigara for erectile dysfunction.     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  Grossly normal neurological exam.     A/P:     1. Immunizations; Tdap 7/25/2011 Pneumococcal 23 done 4/2/2014 Prevnar 13 done 4/29/2015. He has completed the  Shingrix vaccine series.  Pfizer COVID vaccine x 7. RSV done 12/21/2023. Td/Tdap 7/20/2023  2. Increased lipids on Atorvastatin; 7/22/2024; HDL 51, LDL 99 and TG's 124.    3. HTN on Losartan and HcTz.  24 hour BP monitor done on 5/16/2022 was normal at 121/74.  Electrolytes and Creatinine checked 7/22/2024  4.  See Dr. Jara's Urology note from 8/21/2024 (next 12/18/2024). Prostate bx. From 4/5/2023 is positive for prostate cancer. He has  a prostate MRI scan scheduled for 11/25/2024.   5. Colonoscopy; 11/12/2018 and repeat in 10 years   6. Dermatology. Next visit with Dr. Corcoran 5/12/2025 (last 5/8/2024)  7. Audiology visit on 5/3/2023

## 2024-11-25 ENCOUNTER — OFFICE VISIT (OUTPATIENT)
Dept: INTERNAL MEDICINE | Facility: CLINIC | Age: 74
End: 2024-11-25
Payer: COMMERCIAL

## 2024-11-25 ENCOUNTER — ANCILLARY PROCEDURE (OUTPATIENT)
Dept: MRI IMAGING | Facility: CLINIC | Age: 74
End: 2024-11-25
Attending: UROLOGY
Payer: COMMERCIAL

## 2024-11-25 VITALS
TEMPERATURE: 98.2 F | RESPIRATION RATE: 16 BRPM | SYSTOLIC BLOOD PRESSURE: 164 MMHG | OXYGEN SATURATION: 98 % | WEIGHT: 173.6 LBS | HEIGHT: 70 IN | HEART RATE: 52 BPM | DIASTOLIC BLOOD PRESSURE: 87 MMHG | BODY MASS INDEX: 24.85 KG/M2

## 2024-11-25 DIAGNOSIS — N52.8 OTHER MALE ERECTILE DYSFUNCTION: ICD-10-CM

## 2024-11-25 DIAGNOSIS — C61 PROSTATE CANCER (H): ICD-10-CM

## 2024-11-25 DIAGNOSIS — I10 BENIGN ESSENTIAL HYPERTENSION: ICD-10-CM

## 2024-11-25 DIAGNOSIS — E78.00 HIGH CHOLESTEROL: Primary | ICD-10-CM

## 2024-11-25 DIAGNOSIS — E78.00 HIGH BLOOD CHOLESTEROL: ICD-10-CM

## 2024-11-25 PROCEDURE — A9585 GADOBUTROL INJECTION: HCPCS | Performed by: RADIOLOGY

## 2024-11-25 PROCEDURE — 72197 MRI PELVIS W/O & W/DYE: CPT | Performed by: RADIOLOGY

## 2024-11-25 RX ORDER — GADOBUTROL 604.72 MG/ML
10 INJECTION INTRAVENOUS ONCE
Status: COMPLETED | OUTPATIENT
Start: 2024-11-25 | End: 2024-11-25

## 2024-11-25 RX ORDER — SILDENAFIL 50 MG/1
50 TABLET, FILM COATED ORAL DAILY PRN
Qty: 30 TABLET | Refills: 5 | Status: SHIPPED | OUTPATIENT
Start: 2024-11-25

## 2024-11-25 RX ADMIN — GADOBUTROL 8 ML: 604.72 INJECTION INTRAVENOUS at 08:34

## 2024-11-25 NOTE — DISCHARGE INSTRUCTIONS
MRI Contrast Discharge Instructions    The IV contrast you received today will pass out of your body in your  urine. This will happen in the next 24 hours. You will not feel this process.  Your urine will not change color.    Drink at least 4 extra glasses of water or juice today (unless your doctor  has restricted your fluids). This reduces the stress on your kidneys.  You may take your regular medicines.    If you are on dialysis: It is best to have dialysis today.    If you have a reaction: Most reactions happen right away. If you have  any new symptoms after leaving the hospital (such as hives or swelling),  call your hospital at the correct number below. Or call your family doctor.  If you have breathing distress or wheezing, call 911.    Special instructions: ***    I have read and understand the above information.    Signature:______________________________________ Date:___________    Staff:__________________________________________ Date:___________     Time:__________    [place] [facility] Departments:    ___[place]: [phone]  ___[facility]: [phone]  ___[place]: [phone] ___[facility]: [phone]  ___[facility]: [phone]  ___[facility]: [phone]  ___[phone]  ___[facility]: [phone]  ___[phone]

## 2024-12-16 ENCOUNTER — PRE VISIT (OUTPATIENT)
Dept: UROLOGY | Facility: CLINIC | Age: 74
End: 2024-12-16
Payer: COMMERCIAL

## 2024-12-16 NOTE — TELEPHONE ENCOUNTER
Reason for visit: Bx follow up      Dx/Hx/Sx: prostate cancer     Records/imaging/labs/orders: in EPIC     At Rooming: standard rooming    Chiquis Alvarez LPN on 12/16/2024 at 3:59 PM

## 2024-12-18 ENCOUNTER — OFFICE VISIT (OUTPATIENT)
Dept: UROLOGY | Facility: CLINIC | Age: 74
End: 2024-12-18
Payer: COMMERCIAL

## 2024-12-18 VITALS
OXYGEN SATURATION: 99 % | BODY MASS INDEX: 24.34 KG/M2 | WEIGHT: 170 LBS | SYSTOLIC BLOOD PRESSURE: 169 MMHG | HEART RATE: 61 BPM | DIASTOLIC BLOOD PRESSURE: 91 MMHG | HEIGHT: 70 IN

## 2024-12-18 DIAGNOSIS — C61 PROSTATE CANCER (H): Primary | ICD-10-CM

## 2024-12-18 PROCEDURE — G0416 PROSTATE BIOPSY, ANY MTHD: HCPCS | Mod: 26 | Performed by: PATHOLOGY

## 2024-12-18 PROCEDURE — 55700 PR BIOPSY OF PROSTATE,NEEDLE/PUNCH: CPT | Performed by: UROLOGY

## 2024-12-18 PROCEDURE — 76999 ECHO EXAMINATION PROCEDURE: CPT | Performed by: UROLOGY

## 2024-12-18 PROCEDURE — 88305 TISSUE EXAM BY PATHOLOGIST: CPT | Mod: TC | Performed by: UROLOGY

## 2024-12-18 PROCEDURE — 76872 US TRANSRECTAL: CPT | Performed by: UROLOGY

## 2024-12-18 RX ADMIN — GENTAMICIN 80 MG: 40 INJECTION, SOLUTION INTRAMUSCULAR; INTRAVENOUS at 13:24

## 2024-12-18 ASSESSMENT — PAIN SCALES - GENERAL: PAINLEVEL_OUTOF10: MODERATE PAIN (4)

## 2024-12-18 NOTE — PATIENT INSTRUCTIONS
[facility]  Precautions Following a Prostate Biopsy    There are four conditions that you should watch for after a prostate biopsy:    Excessive pain  Bleeding irregularities (passing numerous  dime sized  clots or if your urine looks like cranberry juice)  Fever of 100 degrees or more  If you are unable to urinate      If any of these occur, call the Urology Clinic during normal business hours (M-F, 8:00-4:30) at [phone].  If you experience a problem after normal business hours, call our 24-hour phone number at [phone] and ask for the Urology Resident on call to be paged.    If you experience any discomfort following the biopsy, you may take Tylenol.  DO NOT TAKE ASPIRIN unless specified by your physician.   If the discomfort becomes severe or uncontrolled by medication, contact the Urology Clinic or Urology Resident (after normal business hours).    Do not be alarmed if you have some blood in your stool, in your urine, or ejaculate (semen).  This occurrence is normal and may last up to three (3) or four (4) days, usually intermittently.  Blood in the ejaculate (semen) may last several weeks, up to about a dozen ejaculations.  The blood in your ejaculate may appear as brown streaks, blood tinged, and immediately following a biopsy, it may appear bright red.    If you run a fever above 100 degrees, call the Urology Clinic or Urology Resident (after normal business hours) immediately.  If you are unable to reach your physician or the Resident on call, go to the nearest emergency room.  Explain that you have had a transrectal biopsy of your prostate and what problems you are experiencing.      You should attempt to urinate following your biopsy before you leave the clinic.  If you are unable to urinate four (4) to six (6) hours after you leave the clinic, you will need to contact the Urology Clinic or the Resident on call.  If you are unable to reach your physician or the  Resident on call, go to the nearest emergency room.      If you have any questions or concerns after your biopsy, feel free to contact the Urology Clinic at [phone] during M-F, 8:00-5pm business hours.  If you need to speak with someone after normal business hours, call [phone] and ask for the Resident on call to be paged.

## 2024-12-18 NOTE — NURSING NOTE
During Procedure: Verification of correct person, site, and procedure occurs any time the responsibility for care of the patient is transferred to another member of the care team.    The following medication was given:     MEDICATION:  Gentamicin  ROUTE: IM  SITE: Ventrogluteal - Right  DOSE: 80 mg  LOT #: [id number]  : Bday  EXPIRATION DATE: 2026/01  NDC#: 89556-123-31   Was there drug waste? No    Prior to injection, verified patient identity using patient's name and date of birth.  Due to injection administration, patient instructed to remain in clinic for 15 minutes  afterwards, and to report any adverse reaction to me immediately.    Drug Amount Wasted:  None.  Vial/Syringe: Single dose vial    Alysia Lujan  12/18/2024  1:20 PM

## 2024-12-18 NOTE — NURSING NOTE
"Chief Complaint   Patient presents with    Consult     Pt states here for a porstate biopsy       Blood pressure (!) 169/91, pulse 61, height 1.778 m (5' 10\"), weight 77.1 kg (170 lb), SpO2 99%. Body mass index is 24.39 kg/m .    Patient Active Problem List   Diagnosis    Bursitis    History of actinic keratoses    Cherry angioma    Multiple nevi    Benign essential hypertension       Allergies   Allergen Reactions    No Known Drug Allergy        Current Outpatient Medications   Medication Sig Dispense Refill    atorvastatin (LIPITOR) 10 MG tablet Take 1 tablet (10 mg) by mouth daily. 90 tablet 0    ciprofloxacin (CIPRO) 500 MG tablet Take 1 tablet (500 mg) by mouth 2 times daily for 3 days. Please begin taking this medication the morning prior to your procedure (2024).  Take one pill in the morning and one pill in the afternoon the day before (2024), the day of (2024), and the day after (2024) your procedure. 6 tablet 0    hydroCHLOROthiazide 12.5 MG tablet Take 1 tablet (12.5 mg) by mouth daily. 90 tablet 0    losartan (COZAAR) 50 MG tablet Take 2 tablets (100 mg) by mouth daily. 180 tablet 0    mupirocin (BACTROBAN) 2 % external ointment Apply topically 3 times daily 22 g 0    sildenafil (VIAGRA) 50 MG tablet Take 1 tablet (50 mg) by mouth daily as needed. 30 tablet 5       Social History     Tobacco Use    Smoking status: Former     Current packs/day: 0.00     Average packs/day: 1.5 packs/day for 30.0 years (45.0 ttl pk-yrs)     Types: Cigarettes     Start date: 1972     Quit date: 2002     Years since quittin.3    Smokeless tobacco: Never   Substance Use Topics    Alcohol use: Yes     Alcohol/week: 0.0 standard drinks of alcohol     Comment: 12 per week    Drug use: No     Types: Marijuana       Invasive Procedure Safety Checklist:    Procedure: MRI fusion TRUS prostate biopsy    Action: Complete sections and checkboxes as appropriate.    Pre-procedure:  1. Patient ID " Verified with 2 identifiers (Varsha and  or MRN) : YES    2. Procedure and site verified with patient/designee (when able) : YES    3. Accurate consent documentation in medical record : YES    4. H&P (or appropriate assessment) documented in medical record : N/A  H&P must be up to 30 days prior to procedure an updated within 24 hours of                 Procedure as applicable.     5. Relevant diagnostic and radiology test results appropriately labeled and displayed as applicable : YES    6. Blood products, implants, devices, and/or special equipment available for the procedure as applicable : YES    7. Procedure site(s) marked with provider initials [Exclusions: none] : NO    8. Marking not required. Reason : Yes  Procedure does not require site marking    Time Out:     Time-Out performed immediately prior to starting procedure, including verbal and active participation of all team members addressing: YES    1. Correct patient identity.  2. Confirmed that the correct side and site are marked.  3. An accurate procedure to be done.  4. Agreement on the procedure to be done.  5. Correct patient position.  6. Relevant images and results are properly labeled and appropriately displayed.  7. The need to administer antibiotics or fluids for irrigation purposes during the procedure as applicable.  8. Safety precautions based on patient history or medication use.    During Procedure: Verification of correct person, site, and procedure occurs any time the responsibility for care of the patient is transferred to another member of the care team.    The following medication was given:     MEDICATION:  Lidocaine without epinephrine 1%  ROUTE: administered by physician - prostate nerve block   SITE: administered by physician - prostate nerve block    DOSE: 10 mL  LOT #: [id number]  : Lazarus Effect  EXPIRATION DATE:   NDC#: 69175-700-59   Was there drug waste? Yes  Amount of drug waste (mL): 20 mL.  Reason for  waste:  Single use vial    Prior to injection, verified patient identity using patient's name and date of birth.  Due to injection administration, patient instructed to remain in clinic for 15 minutes  afterwards, and to report any adverse reaction to me immediately.    Drug Amount Wasted:  Yes: 20 mL (10 mg/ml )  Vial/Syringe: Single dose vial        [facility]  Precautions Following a Prostate Biopsy    There are four conditions that you should watch for after a prostate biopsy:    Excessive pain  Bleeding irregularities (passing numerous  dime sized  clots or if your urine looks like cranberry juice)  Fever of 100 degrees or more  If you are unable to urinate      If any of these occur, call the Urology Clinic during normal business hours (M-F, 8:00-4:30) at [phone].  If you experience a problem after normal business hours, call our 24-hour phone number at [phone] and ask for the Urology Resident on call to be paged.    If you experience any discomfort following the biopsy, you may take Tylenol.  DO NOT TAKE ASPIRIN unless specified by your physician.   If the discomfort becomes severe or uncontrolled by medication, contact the Urology Clinic or Urology Resident (after normal business hours).    Do not be alarmed if you have some blood in your stool, in your urine, or ejaculate (semen).  This occurrence is normal and may last up to three (3) or four (4) days, usually intermittently.  Blood in the ejaculate (semen) may last several weeks, up to about a dozen ejaculations.  The blood in your ejaculate may appear as brown streaks, blood tinged, and immediately following a biopsy, it may appear bright red.    If you run a fever above 100 degrees, call the Urology Clinic or Urology Resident (after normal business hours) immediately.  If you are unable to reach your physician or the Resident on call, go to the nearest emergency room.  Explain that you have had a transrectal  biopsy of your prostate and what problems you are experiencing.      You should attempt to urinate following your biopsy before you leave the clinic.  If you are unable to urinate four (4) to six (6) hours after you leave the clinic, you will need to contact the Urology Clinic or the Resident on call.  If you are unable to reach your physician or the Resident on call, go to the nearest emergency room.      If you have any questions or concerns after your biopsy, feel free to contact the Urology Clinic at [phone] during M-F, 8:00-5pm business hours.  If you need to speak with someone after normal business hours, call [phone] and ask for the Resident on call to be paged.       Deshawn Mark  12/18/2024  12:43 PM

## 2024-12-18 NOTE — LETTER
12/18/2024       RE: Franky Koehler  [address]  [facility] [address]     Dear Colleague,    Thank you for referring your patient, Franky Koehler, to the [facility] [place] at [facility]. Please see a copy of my visit note below.    PREPROCEDURE DIAGNOSIS: Elevated PSA   POSTPROCEDURE DIAGNOSIS: Elevated PSA  PROCEDURE: Transrectal ultrasound sizing and transrectal ultrasound guided prostatic needle biopsy, including MRI fusion targeting  for Franky Koehler who is a 74 year old male.   SURGEON: Bhavesh Jara MD   ANESTHESIA: 5 mL of 1% periprostatic block bilaterally   We previously obtained an MRI of the prostate and identified all PIRADS 3-5 lesions for targeting      DESCRIPTION OF PROCEDURE: The procedure, the outcome, the anesthesia, and the risks were discussed with the patient. Informed consent was obtained and signed and a timeout was completed prior to the procedure. Digital rectal examination was performed with the below findings noted. Anesthesia was administered as noted above and the transrectal ultrasound probe was inserted, sizing was performed, and the below findings were noted. 2 core biopsies were taken from each of the MRI targets, and 12 core biopsies were taken as described below. The probe was then withdrawn. Patient tolerated the procedure well.   FINDINGS: Digital rectal exam reveals normal prostate. US images were obtained and then fused with the MRI images.  The fused images were then used to guide the biopsy of the targeted lesions with 2 cores taken of each lesion.  We then performed random biopsies.  12 cores were taken with 6 on each side, base, mid and apex.      Bhavesh Jara MD   Department of Urology   [facility]         Again, thank you for allowing me to participate in the care of your patient.      Sincerely,    Bhavesh Jara MD

## 2024-12-18 NOTE — PROGRESS NOTES
PREPROCEDURE DIAGNOSIS: Elevated PSA   POSTPROCEDURE DIAGNOSIS: Elevated PSA  PROCEDURE: Transrectal ultrasound sizing and transrectal ultrasound guided prostatic needle biopsy, including MRI fusion targeting  for Franky Koehler who is a 74 year old male.   SURGEON: Bhavesh Jara MD   ANESTHESIA: 5 mL of 1% periprostatic block bilaterally   We previously obtained an MRI of the prostate and identified all PIRADS 3-5 lesions for targeting      DESCRIPTION OF PROCEDURE: The procedure, the outcome, the anesthesia, and the risks were discussed with the patient. Informed consent was obtained and signed and a timeout was completed prior to the procedure. Digital rectal examination was performed with the below findings noted. Anesthesia was administered as noted above and the transrectal ultrasound probe was inserted, sizing was performed, and the below findings were noted. 2 core biopsies were taken from each of the MRI targets, and 12 core biopsies were taken as described below. The probe was then withdrawn. Patient tolerated the procedure well.   FINDINGS: Digital rectal exam reveals normal prostate. US images were obtained and then fused with the MRI images.  The fused images were then used to guide the biopsy of the targeted lesions with 2 cores taken of each lesion.  We then performed random biopsies.  12 cores were taken with 6 on each side, base, mid and apex.      Bhavesh Jara MD   Department of Urology   [facility]

## 2025-01-08 ENCOUNTER — VIRTUAL VISIT (OUTPATIENT)
Dept: UROLOGY | Facility: CLINIC | Age: 75
End: 2025-01-08
Payer: COMMERCIAL

## 2025-01-08 DIAGNOSIS — C61 PROSTATE CANCER (H): Primary | ICD-10-CM

## 2025-01-08 NOTE — NURSING NOTE
Current patient location: [place]    Is the patient currently in the state of [place]? YES    Visit mode:VIDEO    If the visit is dropped, the patient can be reconnected by:VIDEO VISIT: Send to e-mail at: [email or web address]    Will anyone else be joining the visit? NO  (If patient encounters technical issues they should call [phone]    Are changes needed to the allergy or medication list? Patient completed e-check in for visit, so VF did not review e-check in information again with patient due to this.    Are refills needed on medications prescribed by this physician? Discuss with provider    Rooming Documentation:  Not applicable    Reason for visit: RECHECK    Tabitha EDWARDS

## 2025-01-08 NOTE — LETTER
"1/8/2025       RE: Franky Koehler  [address] [facility] [address]     Dear Colleague,    Thank you for referring your patient, Franky Koehler, to the [facility]OLOGY CLIN[facility] [place] at [facility]. Please see a copy of my visit note below.    Virtual Visit Details    Type of service:  Video Visit   Video Start Time: 8 AM  Video End Time: 8:15 AM    Originating Location (pt. Location): Home    Distant Location (provider location):  On-site  Platform used for Video Visit: [facility]      Urology Clinic     [facility]  Franky Koehler is a 74 year old male with history of intermediate risk prostate cancer on active surveillance, here for follow-up after his restaging prostate biopsy.      The patient denies any major issues after the biopsy     No changes to health, hospitalizations or new diagnoses in the interim    PHYSICAL EXAM  Vitals:  No vitals were obtained today due to virtual visit.    Physical Exam   GENERAL: Healthy, alert and no distress  EYES: Eyes grossly normal to inspection.  No discharge or erythema, or obvious scleral/conjunctival abnormalities.  RESP: No audible wheeze, cough, or visible cyanosis.  No visible retractions or increased work of breathing.    SKIN: Visible skin clear. No significant rash, abnormal pigmentation or lesions.  NEURO: Cranial nerves grossly intact.  Mentation and speech appropriate for age.  PSYCH: Mentation appears normal, affect normal/bright, judgement and insight intact, normal speech and appearance well-groomed.      Labs  Lab Results   Component Value Date    WBC 5.2 07/22/2024    WBC 6.6 03/23/2021     Lab Results   Component Value Date    RBC 4.98 07/22/2024    RBC 5.16 03/23/2021     Lab Results   Component Value Date    HGB 15.7 07/22/2024    HGB 16.2 03/23/2021     Lab Results   Component Value Date    HCT 46.4 07/22/2024    HCT 48.6 03/23/2021     No components found for: \"MCT\"  Lab Results   Component Value Date "    MCV 93 07/22/2024    MCV 94 03/23/2021     Lab Results   Component Value Date    MCH 31.5 07/22/2024    MCH 31.4 03/23/2021     Lab Results   Component Value Date    MCHC 33.8 07/22/2024    MCHC 33.3 03/23/2021     Lab Results   Component Value Date    RDW 12.9 07/22/2024    RDW 13.1 03/23/2021     Lab Results   Component Value Date     07/22/2024     03/23/2021        Last Comprehensive Metabolic Panel:  Sodium   Date Value Ref Range Status   07/22/2024 138 135 - 145 mmol/L Final   03/23/2021 137 133 - 144 mmol/L Final     Potassium   Date Value Ref Range Status   07/22/2024 4.3 3.4 - 5.3 mmol/L Final   05/16/2022 3.8 3.4 - 5.3 mmol/L Final   03/23/2021 3.8 3.4 - 5.3 mmol/L Final     Chloride   Date Value Ref Range Status   07/22/2024 102 98 - 107 mmol/L Final   05/16/2022 107 94 - 109 mmol/L Final   03/23/2021 105 94 - 109 mmol/L Final     Carbon Dioxide   Date Value Ref Range Status   03/23/2021 28 20 - 32 mmol/L Final     Carbon Dioxide (CO2)   Date Value Ref Range Status   07/22/2024 26 22 - 29 mmol/L Final   05/16/2022 28 20 - 32 mmol/L Final     Anion Gap   Date Value Ref Range Status   07/22/2024 10 7 - 15 mmol/L Final   05/16/2022 6 3 - 14 mmol/L Final   03/23/2021 4 3 - 14 mmol/L Final     Glucose   Date Value Ref Range Status   07/22/2024 108 (H) 70 - 99 mg/dL Final   05/16/2022 104 (H) 70 - 99 mg/dL Final   03/23/2021 93 70 - 99 mg/dL Final     Urea Nitrogen   Date Value Ref Range Status   07/22/2024 14.5 8.0 - 23.0 mg/dL Final   05/16/2022 22 7 - 30 mg/dL Final   03/23/2021 17 7 - 30 mg/dL Final     Creatinine   Date Value Ref Range Status   07/22/2024 0.88 0.67 - 1.17 mg/dL Final   03/23/2021 0.77 0.66 - 1.25 mg/dL Final     GFR Estimate   Date Value Ref Range Status   07/22/2024 >90 >60 mL/min/1.73m2 Final     Comment:     eGFR calculated using 2021 CKD-EPI equation.   03/23/2021 >90 >60 mL/min/[1.73_m2] Final     Comment:     Non  GFR Calc  Starting 12/18/2018, serum  creatinine based estimated GFR (eGFR) will be   calculated using the Chronic Kidney Disease Epidemiology Collaboration   (CKD-EPI) equation.       Calcium   Date Value Ref Range Status   07/22/2024 9.1 8.8 - 10.4 mg/dL Final     Comment:     Reference intervals for this test were updated on 7/16/2024 to reflect our healthy population more accurately. There may be differences in the flagging of prior results with similar values performed with this method. Those prior results can be interpreted in the context of the updated reference intervals.   03/23/2021 9.2 8.5 - 10.1 mg/dL Final     Bilirubin Total   Date Value Ref Range Status   07/22/2024 1.2 <=1.2 mg/dL Final   03/23/2021 1.4 (H) 0.2 - 1.3 mg/dL Final     Alkaline Phosphatase   Date Value Ref Range Status   07/22/2024 90 40 - 150 U/L Final   03/23/2021 90 40 - 150 U/L Final     ALT   Date Value Ref Range Status   07/22/2024 24 0 - 70 U/L Final   03/23/2021 33 0 - 70 U/L Final     AST   Date Value Ref Range Status   07/22/2024 30 0 - 45 U/L Final   03/23/2021 23 0 - 45 U/L Final       PSA   Date Value Ref Range Status   08/25/2020 2.90 0 - 4 ug/L Final     Comment:     Assay Method:  Chemiluminescence using Siemens Vista analyzer   06/18/2019 2.21 0 - 4 ug/L Final     Comment:     Assay Method:  Chemiluminescence using Siemens Vista analyzer   06/19/2017 2.04 0 - 4 ug/L Final     Comment:     Assay Method:  Chemiluminescence using Siemens Vista analyzer   05/03/2016 1.78 0 - 4 ug/L Final   04/29/2015 1.81 0 - 4 ug/L Final   05/05/2014 1.19 0 - 4 ug/L Final     Comment:     PSA results are about 7% lower than our prior method due to a methodology   change   on August 30, 2011.     10/19/2012 1.29 0 - 4 ug/L Final     Comment:     PSA results are about 7% lower than our prior method due to a methodology   change   on August 30, 2011.     Prostate Specific Antigen Screen   Date Value Ref Range Status   07/22/2024 5.14 0.00 - 6.50 ng/mL Final   11/16/2022 5.84 0.00  - 6.50 ng/mL Final   05/16/2022 3.71 0.00 - 4.00 ug/L Final     PSA Tumor Marker   Date Value Ref Range Status   10/17/2023 3.80 0.00 - 6.50 ng/mL Final        Surgical pathology       Final Diagnosis   A.  Prostate, prostate target 1 x2, core needle biopsy:  - Acinar adenocarcinoma  - Orlando score 7 (3+4; 30% of pattern 4)   - Grade group 2  - Extent: 1 out of 2 cores positive (1 mm; 10%)      B.  Prostate, base, left x 2, core needle biopsy:  - Benign prostatic tissue     C.  Prostate, mid, left x 2, core needle biopsy:  - Benign prostatic tissue     D. Prostate, [place], Left, x2, core needle biopsy:  - Benign prostatic tissue     F. Prostate, Base, Right, x2, core needle biopsy:  - Acinar adenocarcinoma  - Ruf[place] score 6 (3+3)  - Grade group 1  - Extent: 1 out of 2 cores positive (1 mm; 10%)     G. Prostate, Mid, Right, x2, core needle biopsy:  - Acinar adenocarcinoma  - Orlando score 7 (3+4; 10% of pattern 4)   - Grade group 2  - Extent: 1 out of 2 cores positive (5 mm; 30%)     H. Prostate, [place], Right, x2, core needle biopsy:  - Acinar adenocarcinoma  - Orlando score 7 (3+4; 20% of pattern 4)   - Grade group 2  - Extent: 1 out of 2 cores positive (4 mm; 15%)   Electronically signed by Geovanna Phoenix MD on 12/20/2024 at  6:04 PM   Clinical Information         Imaging   MR prostate 11/25/2024    FINDINGS:  Size: 4.6 x 3.3 x 5.2 cm. 41.0 grams  Hemorrhage: Absent  Peripheral zone: Heterogeneous on T2-weighted images. Suspicious  lesions as detailed below.  Transition zone: Enlarged with BPH changes. Transition zone nodules  which are circumscribed or mostly encapsulated without diffusion  restriction.  PI-RADS 2.  No highly suspicious nodules.     Lesion(s) in rank order of severity (highest score- to lowest score,  then by size)      Lesion 1: Not significantly changed from MRI 1/2/2023.  Location: Right apex and mid gland peripheral zone at the 7 o'clock  position relative to the urethra. Series 6 image 66.    Additional prostate regions involved: None  Size: 9 mm  T2 description: Moderately hypointense  T2 numerical assessment: 4  DWI description: Focal markedly hyperintense on DWI and markedly  hypointense on ADC  DWI numerical assessment: 4  DCE assessment: Positive    Prostate margin: Capsular abutment 6-15 mm with smooth contour  Lesion overall PI-RADS category: 4        Neurovascular bundles: No neurovascular bundle involvement by  malignancy.  Seminal vesicles: No seminal vesicle involvement by malignancy.  Lymph nodes: No lymph node involvement  Bones: No suspicious lesions. Degenerative changes at L5-S1.  Other pelvic organs: No additional findings.                                                                         IMPRESSION:  1. Based on the most suspicious abnormality, this exam is  characterized as PIRADS 4 - Clinically significant cancer is likely to  be present.  The most suspicious abnormality is located at the right  apex and mid gland peripheral zone and there is short segment capsular  abutment with low likelihood of minimal extraprostatic extension. This  is not significantly changed compared to MRI 1/2/2023.  2. No suspicious adenopathy or evidence of pelvic metastases.  3. Previously described PI-RADS 3 lesion in the left mid gland  peripheral zone is not demonstrated on today's exam    ASSESSMENT AND PLAN  74 year old male with favorable intermediate risk prostate cancer with slight increase in pattern 4 percentage      We had a long conversation regarding the management of his intermediate risk prostate cancer.  We discussed the option of active surveillance versus active treatment.  He is reluctant to undergo any active treatment at this point especially knowing that there is only a subtle progression of his disease.  He would like to continue with active surveillance at this point.  He understands that we will have a lower threshold of switching to active treatment with any persistent  increase in PSA values or any new changes in his future prostate MRI.      Plan   Repeat PSA in 2 to 3 weeks  Call the patient with the results    40 total minutes spent on the date of the encounter including direct interaction with the patient, performing chart review, documentation and further activities as noted above    Bhavesh Jara MD   Department of Urology   [facility]                   Again, thank you for allowing me to participate in the care of your patient.      Sincerely,    Bhavesh Jara MD

## 2025-01-08 NOTE — PROGRESS NOTES
"Virtual Visit Details    Type of service:  Video Visit   Video Start Time: 8 AM  Video End Time: 8:15 AM    Originating Location (pt. Location): Home    Distant Location (provider location):  On-site  Platform used for Video Visit: [facility]      Urology Clinic     HPI  Franky Koehler is a 74 year old male with history of intermediate risk prostate cancer on active surveillance, here for follow-up after his restaging prostate biopsy.      The patient denies any major issues after the biopsy     No changes to health, hospitalizations or new diagnoses in the interim    PHYSICAL EXAM  Vitals:  No vitals were obtained today due to virtual visit.    Physical Exam   GENERAL: Healthy, alert and no distress  EYES: Eyes grossly normal to inspection.  No discharge or erythema, or obvious scleral/conjunctival abnormalities.  RESP: No audible wheeze, cough, or visible cyanosis.  No visible retractions or increased work of breathing.    SKIN: Visible skin clear. No significant rash, abnormal pigmentation or lesions.  NEURO: Cranial nerves grossly intact.  Mentation and speech appropriate for age.  PSYCH: Mentation appears normal, affect normal/bright, judgement and insight intact, normal speech and appearance well-groomed.      Labs  Lab Results   Component Value Date    WBC 5.2 07/22/2024    WBC 6.6 03/23/2021     Lab Results   Component Value Date    RBC 4.98 07/22/2024    RBC 5.16 03/23/2021     Lab Results   Component Value Date    HGB 15.7 07/22/2024    HGB 16.2 03/23/2021     Lab Results   Component Value Date    HCT 46.4 07/22/2024    HCT 48.6 03/23/2021     No components found for: \"MCT\"  Lab Results   Component Value Date    MCV 93 07/22/2024    MCV 94 03/23/2021     Lab Results   Component Value Date    MCH 31.5 07/22/2024    MCH 31.4 03/23/2021     Lab Results   Component Value Date    MCHC 33.8 07/22/2024    MCHC 33.3 03/23/2021     Lab Results   Component Value Date    RDW 12.9 07/22/2024    RDW 13.1 03/23/2021     Lab " Results   Component Value Date     07/22/2024     03/23/2021        Last Comprehensive Metabolic Panel:  Sodium   Date Value Ref Range Status   07/22/2024 138 135 - 145 mmol/L Final   03/23/2021 137 133 - 144 mmol/L Final     Potassium   Date Value Ref Range Status   07/22/2024 4.3 3.4 - 5.3 mmol/L Final   05/16/2022 3.8 3.4 - 5.3 mmol/L Final   03/23/2021 3.8 3.4 - 5.3 mmol/L Final     Chloride   Date Value Ref Range Status   07/22/2024 102 98 - 107 mmol/L Final   05/16/2022 107 94 - 109 mmol/L Final   03/23/2021 105 94 - 109 mmol/L Final     Carbon Dioxide   Date Value Ref Range Status   03/23/2021 28 20 - 32 mmol/L Final     Carbon Dioxide (CO2)   Date Value Ref Range Status   07/22/2024 26 22 - 29 mmol/L Final   05/16/2022 28 20 - 32 mmol/L Final     Anion Gap   Date Value Ref Range Status   07/22/2024 10 7 - 15 mmol/L Final   05/16/2022 6 3 - 14 mmol/L Final   03/23/2021 4 3 - 14 mmol/L Final     Glucose   Date Value Ref Range Status   07/22/2024 108 (H) 70 - 99 mg/dL Final   05/16/2022 104 (H) 70 - 99 mg/dL Final   03/23/2021 93 70 - 99 mg/dL Final     Urea Nitrogen   Date Value Ref Range Status   07/22/2024 14.5 8.0 - 23.0 mg/dL Final   05/16/2022 22 7 - 30 mg/dL Final   03/23/2021 17 7 - 30 mg/dL Final     Creatinine   Date Value Ref Range Status   07/22/2024 0.88 0.67 - 1.17 mg/dL Final   03/23/2021 0.77 0.66 - 1.25 mg/dL Final     GFR Estimate   Date Value Ref Range Status   07/22/2024 >90 >60 mL/min/1.73m2 Final     Comment:     eGFR calculated using 2021 CKD-EPI equation.   03/23/2021 >90 >60 mL/min/[1.73_m2] Final     Comment:     Non  GFR Calc  Starting 12/18/2018, serum creatinine based estimated GFR (eGFR) will be   calculated using the Chronic Kidney Disease Epidemiology Collaboration   (CKD-EPI) equation.       Calcium   Date Value Ref Range Status   07/22/2024 9.1 8.8 - 10.4 mg/dL Final     Comment:     Reference intervals for this test were updated on 7/16/2024 to  reflect our healthy population more accurately. There may be differences in the flagging of prior results with similar values performed with this method. Those prior results can be interpreted in the context of the updated reference intervals.   03/23/2021 9.2 8.5 - 10.1 mg/dL Final     Bilirubin Total   Date Value Ref Range Status   07/22/2024 1.2 <=1.2 mg/dL Final   03/23/2021 1.4 (H) 0.2 - 1.3 mg/dL Final     Alkaline Phosphatase   Date Value Ref Range Status   07/22/2024 90 40 - 150 U/L Final   03/23/2021 90 40 - 150 U/L Final     ALT   Date Value Ref Range Status   07/22/2024 24 0 - 70 U/L Final   03/23/2021 33 0 - 70 U/L Final     AST   Date Value Ref Range Status   07/22/2024 30 0 - 45 U/L Final   03/23/2021 23 0 - 45 U/L Final       PSA   Date Value Ref Range Status   08/25/2020 2.90 0 - 4 ug/L Final     Comment:     Assay Method:  Chemiluminescence using Siemens Vista analyzer   06/18/2019 2.21 0 - 4 ug/L Final     Comment:     Assay Method:  Chemiluminescence using Siemens Vista analyzer   06/19/2017 2.04 0 - 4 ug/L Final     Comment:     Assay Method:  Chemiluminescence using Siemens Vista analyzer   05/03/2016 1.78 0 - 4 ug/L Final   04/29/2015 1.81 0 - 4 ug/L Final   05/05/2014 1.19 0 - 4 ug/L Final     Comment:     PSA results are about 7% lower than our prior method due to a methodology   change   on August 30, 2011.     10/19/2012 1.29 0 - 4 ug/L Final     Comment:     PSA results are about 7% lower than our prior method due to a methodology   change   on August 30, 2011.     Prostate Specific Antigen Screen   Date Value Ref Range Status   07/22/2024 5.14 0.00 - 6.50 ng/mL Final   11/16/2022 5.84 0.00 - 6.50 ng/mL Final   05/16/2022 3.71 0.00 - 4.00 ug/L Final     PSA Tumor Marker   Date Value Ref Range Status   10/17/2023 3.80 0.00 - 6.50 ng/mL Final        Surgical pathology       Final Diagnosis   A.  Prostate, prostate target 1 x2, core needle biopsy:  - Acinar adenocarcinoma  - [place] score 7  (3+4; 30% of pattern 4)   - Grade group 2  - Extent: 1 out of 2 cores positive (1 mm; 10%)      B.  Prostate, base, left x 2, core needle biopsy:  - Benign prostatic tissue     C.  Prostate, mid, left x 2, core needle biopsy:  - Benign prostatic tissue     D. Prostate, [place], Left, x2, core needle biopsy:  - Benign prostatic tissue     F. Prostate, Base, Right, x2, core needle biopsy:  - Acinar adenocarcinoma  - [place] score 6 (3+3)  - Grade group 1  - Extent: 1 out of 2 cores positive (1 mm; 10%)     G. Prostate, Mid, Right, x2, core needle biopsy:  - Acinar adenocarcinoma  - [place] score 7 (3+4; 10% of pattern 4)   - Grade group 2  - Extent: 1 out of 2 cores positive (5 mm; 30%)     H. Prostate, [place], Right, x2, core needle biopsy:  - Acinar adenocarcinoma  - Ruf[place] score 7 (3+4; 20% of pattern 4)   - Grade group 2  - Extent: 1 out of 2 cores positive (4 mm; 15%)   Electronically signed by Geovanna Phoenix MD on 12/20/2024 at  6:04 PM   Clinical Information         Imaging   MR prostate 11/25/2024    FINDINGS:  Size: 4.6 x 3.3 x 5.2 cm. 41.0 grams  Hemorrhage: Absent  Peripheral zone: Heterogeneous on T2-weighted images. Suspicious  lesions as detailed below.  Transition zone: Enlarged with BPH changes. Transition zone nodules  which are circumscribed or mostly encapsulated without diffusion  restriction.  PI-RADS 2.  No highly suspicious nodules.     Lesion(s) in rank order of severity (highest score- to lowest score,  then by size)      Lesion 1: Not significantly changed from MRI 1/2/2023.  Location: Right apex and mid gland peripheral zone at the 7 o'clock  position relative to the urethra. Series 6 image 66.   Additional prostate regions involved: None  Size: 9 mm  T2 description: Moderately hypointense  T2 numerical assessment: 4  DWI description: Focal markedly hyperintense on DWI and markedly  hypointense on ADC  DWI numerical assessment: 4  DCE assessment: Positive    Prostate margin: Capsular abutment  6-15 mm with smooth contour  Lesion overall PI-RADS category: 4        Neurovascular bundles: No neurovascular bundle involvement by  malignancy.  Seminal vesicles: No seminal vesicle involvement by malignancy.  Lymph nodes: No lymph node involvement  Bones: No suspicious lesions. Degenerative changes at L5-S1.  Other pelvic organs: No additional findings.                                                                         IMPRESSION:  1. Based on the most suspicious abnormality, this exam is  characterized as PIRADS 4 - Clinically significant cancer is likely to  be present.  The most suspicious abnormality is located at the right  apex and mid gland peripheral zone and there is short segment capsular  abutment with low likelihood of minimal extraprostatic extension. This  is not significantly changed compared to MRI 1/2/2023.  2. No suspicious adenopathy or evidence of pelvic metastases.  3. Previously described PI-RADS 3 lesion in the left mid gland  peripheral zone is not demonstrated on today's exam    ASSESSMENT AND PLAN  74 year old male with favorable intermediate risk prostate cancer with slight increase in pattern 4 percentage      We had a long conversation regarding the management of his intermediate risk prostate cancer.  We discussed the option of active surveillance versus active treatment.  He is reluctant to undergo any active treatment at this point especially knowing that there is only a subtle progression of his disease.  He would like to continue with active surveillance at this point.  He understands that we will have a lower threshold of switching to active treatment with any persistent increase in PSA values or any new changes in his future prostate MRI.      Plan   Repeat PSA in 2 to 3 weeks  Call the patient with the results    40 total minutes spent on the date of the encounter including direct interaction with the patient, performing chart review, documentation and further activities  as noted above    Bhavesh Jara MD   Department of Urology   [facility]

## 2025-01-23 DIAGNOSIS — N52.8 OTHER MALE ERECTILE DYSFUNCTION: ICD-10-CM

## 2025-01-23 DIAGNOSIS — E78.00 HIGH BLOOD CHOLESTEROL: ICD-10-CM

## 2025-01-23 DIAGNOSIS — I10 ESSENTIAL HYPERTENSION: ICD-10-CM

## 2025-01-23 DIAGNOSIS — I10 BENIGN ESSENTIAL HYPERTENSION: ICD-10-CM

## 2025-01-27 RX ORDER — LOSARTAN POTASSIUM 50 MG/1
100 TABLET ORAL DAILY
Qty: 180 TABLET | Refills: 3 | Status: SHIPPED | OUTPATIENT
Start: 2025-01-27

## 2025-01-27 RX ORDER — ATORVASTATIN CALCIUM 10 MG/1
10 TABLET, FILM COATED ORAL DAILY
Qty: 90 TABLET | Refills: 1 | Status: SHIPPED | OUTPATIENT
Start: 2025-01-27

## 2025-01-27 RX ORDER — HYDROCHLOROTHIAZIDE 12.5 MG/1
12.5 TABLET ORAL DAILY
Qty: 90 TABLET | Refills: 3 | Status: SHIPPED | OUTPATIENT
Start: 2025-01-27

## 2025-01-27 NOTE — TELEPHONE ENCOUNTER
atorvastatin (LIPITOR) 10 MG tablet  10 mg, DAILY           Summary: Take 1 tablet (10 mg) by mouth daily., Disp-90 tablet, R-0, E-Prescribe  Dose, Route, Frequency: 10 mg, Oral, DAILYStart: 11/05/2024Ord/Sold: 11/05/2024 (O)Ordered On: 11/05/2024Pharmacy: [facility] [place] - [address] AT [address] [facility] Associated: Taking: Long-term: Med Note:              Patient Sig: Take 1 tablet (10 mg) by mouth daily.  Ordering Department: [facility] [place] [facility]  Authorized By: Fredy Joya MD  Dispense: 90 tablet  Refills: 0 ordered      Last Office Visit : 11/25/24  Future Office visit:  None  Recent Labs   Lab Test 07/22/24  0904 07/10/23  1544   CHOL 176 195   HDL 52 59   LDL 99 117*   TRIG 124 96      Refilled per protocol    hydroCHLOROthiazide 12.5 MG tablet  12.5 mg, DAILY           Summary: Take 1 tablet (12.5 mg) by mouth daily., Disp-90 tablet, R-0, E-Prescribe  Dose, Route, Frequency: 12.5 mg, Oral, DAILYStart: 11/05/2024Ord/Sold: 11/05/2024 (O)Ordered On: 11/05/2024Pharmacy: [facility] - [place], [place] - [address] AT [address] [facility] Associated: Taking: Long-term: Med Note:              Patient Sig: Take 1 tablet (12.5 mg) by mouth daily.  Ordering Department: [facility] [place] [facility]  Authorized By: Fredy Joya MD  Dispense: 90 tablet  Refills: 0 ordered        Routing refill request to provider for review/approval because:    Diuretics (Including Combos) Protocol [id number]/23/2025 08:43 AM   Protocol Details Most recent blood pressure under 140/90 in past 12 months        BP Readings from Last 3 Encounters:   12/18/24 (!) 169/91   11/25/24 (!) 164/87   06/16/24 (!) 164/84       losartan (COZAAR) 50 MG tablet 100 mg, DAILY 0 ordered        Summary: Take 2 tablets (100 mg) by mouth daily., Disp-180 tablet, R-0, E-Prescribe  Guidelines: Dose, Route, Frequency: 100 mg, Oral, DAILYStart: 11/05/2024Ord/Sold: 11/05/2024 (O)Ordered On:  11/05/2024Pharmacy: [facility] - [place], [place] - [address] Associated: Taking: Long-term: Med Note:              Patient Sig: Take 2 tablets (100 mg) by mouth daily.  Ordering Department: [facility] [place] [facility]  Authorized By: Fredy Joya MD  Dispense: 180 tablet       Routing refill request to provider for review/approval because:    Angiotensin-II Receptors [id number]/23/2025 08:43 AM   Protocol Details Most recent blood pressure under 140/90 in past 12 months        BP Readings from Last 3 Encounters:   12/18/24 (!) 169/91   11/25/24 (!) 164/87   06/16/24 (!) 164/84     Estela PATRICIO RN  P Central Nursing/Red Flag Triage & Med Refill Team

## 2025-01-28 ENCOUNTER — DOCUMENTATION ONLY (OUTPATIENT)
Dept: OTHER | Facility: CLINIC | Age: 75
End: 2025-01-28
Payer: COMMERCIAL

## 2025-02-10 ENCOUNTER — LAB (OUTPATIENT)
Dept: LAB | Facility: CLINIC | Age: 75
End: 2025-02-10
Payer: COMMERCIAL

## 2025-02-10 DIAGNOSIS — C61 PROSTATE CANCER (H): ICD-10-CM

## 2025-02-10 LAB — PSA SERPL DL<=0.01 NG/ML-MCNC: 4.58 NG/ML (ref 0–6.5)

## 2025-02-10 PROCEDURE — 84153 ASSAY OF PSA TOTAL: CPT

## 2025-02-10 PROCEDURE — 36415 COLL VENOUS BLD VENIPUNCTURE: CPT

## 2025-03-23 ENCOUNTER — HEALTH MAINTENANCE LETTER (OUTPATIENT)
Age: 75
End: 2025-03-23

## 2025-05-12 ENCOUNTER — OFFICE VISIT (OUTPATIENT)
Dept: DERMATOLOGY | Facility: CLINIC | Age: 75
End: 2025-05-12
Payer: COMMERCIAL

## 2025-05-12 DIAGNOSIS — D22.39 FIBROUS PAPULE OF NOSE: ICD-10-CM

## 2025-05-12 DIAGNOSIS — D18.01 CHERRY ANGIOMA: ICD-10-CM

## 2025-05-12 DIAGNOSIS — L82.1 SK (SEBORRHEIC KERATOSIS): ICD-10-CM

## 2025-05-12 DIAGNOSIS — Z12.83 SKIN CANCER SCREENING: Primary | ICD-10-CM

## 2025-05-12 ASSESSMENT — PAIN SCALES - GENERAL: PAINLEVEL_OUTOF10: NO PAIN (0)

## 2025-05-12 NOTE — PROGRESS NOTES
[facility] Dermatology Note  Encounter Date: May 12, 2025  Office Visit     Dermatology Problem List:  # FBSE 05/12/25   - benign findings: seborrheic keratosis, fibrous papule, cherry angiomas  # AK  - LN2 in past   # History of living in [place]   ____________________________________________    Assessment & Plan:    # FBSE 05/12/25   - benign findings: SK, fibrous papule, cherry angiomas  The nature of sun-induced photo-aging and skin cancers is discussed.  Sun avoidance, protective clothing, and the use of 30-SPF sunscreens is advised. Observe closely for skin damage/changes, and call if such occurs.   - reassured patient regarding the benign nature of these lesions.  - reviewed sun protection measures (i.e. protective clothing and broad spectrum sunscreen).   - ABCDEs of melanoma reviewed      Follow-up: 1 year(s) in-person, or earlier for new or changing lesions    Staff and Resident:   Dr. Corcoran and Annette Barksdale,    I, Tessa Corcoran MD, saw this patient with the resident and agree with the resident s findings and plan of care as documented in the resident s note.    ____________________________________________    CC: Skin Check (FBSE- No areas of concern. )    HPI:  Mr. Franky Koehler is a(n) 74 year old male who presents today as a return patient for skin cancer screening.     - denies any lesions of concern    - uses sunscreen and wears sun protective clothing: yes, sunscreen - just started using recently    - tanning bed use: NO     - history of sunburns: in the past, nothing recently    - history of skin cancer: no  - family history of melanoma: no     Patient is otherwise feeling well, without additional skin concerns.    Labs:  None reviewed.    Physical Exam:  Vitals: There were no vitals taken for this visit.  SKIN: Full skin, which includes the head/face, both arms, chest, back, abdomen,both legs, genitalia and/or groin buttocks, digits and/or nails, was  examined.    - Numerous waxy, stuck-on, flesh-colored to brown plaques scattered across the back and chest   - 4 mm homogenously blue macule on caudal back, consistent with dilated blood vessel   - 1-2 cm scaly macules noted on the left dorsal forearm, left ventral thigh  - 5mm tan, fibrous papule appreciated on the nose  - Small red papules scattered to trunk   - No concern features on dermoscopy     Medications:  Current Outpatient Medications   Medication Sig Dispense Refill    atorvastatin (LIPITOR) 10 MG tablet Take 1 tablet (10 mg) by mouth daily. 90 tablet 1    hydroCHLOROthiazide 12.5 MG tablet Take 1 tablet (12.5 mg) by mouth daily. 90 tablet 3    losartan (COZAAR) 50 MG tablet Take 2 tablets (100 mg) by mouth daily. 180 tablet 3    sildenafil (VIAGRA) 50 MG tablet Take 1 tablet (50 mg) by mouth daily as needed. 30 tablet 5    mupirocin (BACTROBAN) 2 % external ointment Apply topically 3 times daily 22 g 0     No current facility-administered medications for this visit.      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

## 2025-05-12 NOTE — PATIENT INSTRUCTIONS
Learning the ABCDEs or Melanomas / Self Skin checks  Even if you have carefully practiced sun safety all summer, it's important to continue being vigilant about your skin in fall, winter, and beyond. Throughout the year, you should examine your skin head-to-toe once a month, looking for any suspicious lesions. Self-exams can help you identify potential skin cancers early, when they can almost always be completely cured. As a general rule, to spot either melanomas or non-melanoma skin cancers (such as basal cell carcinoma and squamous cell carcinoma), take note of any new moles or growths, and any existing growths that begin to grow or change significantly in any other way.  Lesions that change, itch, bleed, or don't heal are also alarm signals. Physicians have developed two specific strategies for early recognition ofmelanoma, the deadliest form of skin cancer: the ABCDEs and the Ugly Duckling sign.      Moles, brown spots and growths on the skin are usually harmless -- but not always. Anyone who has more than 100 moles is at greater risk for melanoma. The first signs can appear in one or more atypical moles. That's why it's so important to get to know your skin very well and to recognize any changes in the moles on your body. Look for the ABCDE signs of melanoma, and if you see one or more, make an appointment with a physician immediately.    Common, benign moles look the same over time. Be on alert when moles start to evolve or change and see a doctor. Any change -- in size, shape, color, elevation, or another trait, or any new symptom such as bleeding, itching or crusting -- points to danger.    A - Asymmetry  This benign mole is not asymmetrical. If you draw a line through the middle, the two sides will match, meaning it is symmetrical. If you draw a line through this mole, the two halves will not match, meaning it is asymmetrical, a warning sign for melanoma.    B - Border  A benign mole has smooth, even  borders, unlike melanomas. The borders of an early melanoma tend to be uneven. The edges may be scalloped or notched.     C - Color  Most benign moles are all one color -- often a single shade of brown. Having a variety of colors is another warning signal. A number of different shades of brown, tan or black could appear. A melanoma may also become red, white or blue.      D - Diameter  Benign moles usually have a smaller diameter than malignant ones. Melanomas usually are larger in diameter than the eraser on your pencil tip (  inch or 6mm), but they may sometimes be smaller when first detected.      E - Evolution  Common, benign moles look the same over time. Be on the alert when a mole starts to evolve or change in any way. When a mole is evolving, see a doctor. Any change -- in size, shape, color, elevation, or another trait, or any new symptom such as bleeding, itching or crusting -- points to danger.    The Ugly Duckling Rule  This is a simplified method where you look for any moles that do not match the other moles you have. Even if some of your moles look a little funny we are less concerned if they match one another. We are looking for the outlier - the one that is darker, larger, etc. In A, there is one dominant mole pattern with slight variation in size. The outlier lesion is clearly darker and larger than all other moles. In B, the patient has two predominant mole patterns, one with larger nevi and one with small, darker nevi. The outlier lesion is small but lacks pigmentation. In C, the patient shows only one lesion on the back. If this lesion is changing, symptomatic, or deemed atypical, it should be removed.      Seborrheic keratoses - a mimicker of melanoma    Seborrheic keratosis (seb-o-REE-ik care-uh-TOE-sis) is a common skin growth. It may seem worrisome because it can look like a wart, pre-cancerous skin growth (actinic keratosis), or skin cancer. Despite their appearance, seborrheic keratoses are  "harmless.    Most people get these growths when they are middle aged or older. Because they begin at a later age and can have a flat, waxy or wart-like appearance, seborrheic keratoses are often called the  barnacles of aging  or \"wisdom spots\".    It s possible to have just one of these growths, but most people develop several. Seborrheic keratoses range in color from white to black; however, most are tan or brown. You can find these harmless growths anywhere on the skin, except the palms and soles. Most often, you ll see them on the chest, back, head, or neck. Seborrheic keratoses are not contagious.            Sun Protection    Sunscreen   What does \"broad spectrum mean\"?  Broad spectrum sunscreens protect against both UVA and UVB radiation. UVC is filtered out by the ozone layer.     What does SPF mean?   SPF stands for  Sun Protection Factor  and represents the ability to screen only UVB (burning) rays. UVB rays are mostly blocked in all sunscreens, but only those that contain titanium dioxide, zinc oxide, mexoryl or Parsol 1789 (avobenzone) block the UVA spectrum. Even though a sunscreen is labeled  UVA/UVB Protection  that is not entirely accurate because products that only partially protect against UVA can claim to protect against both UVA and UVB.     What SPF should I chose?   Aim to get a sunscreen that is at least sun protection factor (SPF) 30. SPF 15 provides about 92-93% coverage, SPF 30 about 95-97% coverage, and SPF 45 about 98% coverage. That is to say, SPF 30 is not twice as good as SPF 15. The reason why we recommend SPF 30 is because we are usually only putting on half the necessary amount of sunscreen to achieve the advertised protection. That means that it is very possible that your SPF 30 sunscreen is only providing you with SPF 15 coverage based on how much you are applying. SPF 15 (92-93% coverage) is the absolute minimal that we recommend. Similarly, the benefit of sunscreens with SPF " higher than 50 is that even if you put on less than the required amount, you are likely still getting good protection (ex: even if you apply only half the recommended amount of , it should still provide you with an SPF of 50).     How much should I apply?  If covering your whole body, you should be using 30 grams, or one ounce, which is how much is in one shot glass! That s a THICK layer! May times, you are only applying half the recommended amount, which means that you are only getting half the SPF (for example, you may be using SPF 30 but if you're only applying half the recommended amount, you're only getting SPF 15 protection.      When do I need to wear sunscreen?  Every day, rain or shine! Even on a rainy day or a day when you are only indoors, you are still being exposed harmful UV radiation from the sun. We usually recommend physical/mineral sunscreens (active ingredient is titanium dioxide or zinc oxide) as these ingredients have been around for many years, there is no concern of them being absorbed into the bloodstream, and they are coral reef friendly! However, the best sunscreen is the one that you will use everyday.     What about my kids?  Sunscreen is not recommended for infants under the age of 6 months. Use clothing, shade and sun avoidance for small infants. For kids older than 6 months, we recommend that you should use only mineral/physical sunscreens that have zinc oxide as the active ingredient. Sun-protective clothing and hats are also important for people of all ages.     Sun protective clothing and Resources   Coolibar (www.coolibar.com)  Malauzai Software End (www.ClariFI.com)  Athleta (www.athleta.[employer])  Thumb (www.SADAR 3D.[employer])  Carve Designs (Yidio) - affordable  Skinz (uvskinz.com)    Long sleeve - Lavern Cool DRI UPF 50 or [place] PFG UPF 50  Hoodie - [place] PFG UPF 50  Swimshirt/Rash Guard - Jumana UPF 50 (on Amazon)  Neck - Outdoor Research Ubertubes  (www.outdoorresearch.Novapost)      Do I need tinted sunscreen?  There is more and more research showing that visible light can also lead to discoloration (such as melasma). Tinted sunscreens (which contain iron oxides) protect against visible light as an added bonus.     What brands do you recommend?  Physical/Mineral Sunscreens (in no particular order)  Elta MD UV Physical Broad-Spectrum SPF 41 Sunscreen (Tinted, $33)  Skin Ceuticals Physical Fusion UV Defense SPF 50 (Tinted, $34)  Unsun Mineral Tinted Face Sunscreen (Tinted, with 2 shade ranges, $29)  It Cosmetics CC+ Cream with SPF 50+ (Tinted, can also double as foundation/coverage -- great range of shades, $40)  Biossance Squalane + Zinc Sheer Mineral Sunscreen SPF 30 PA +++ (goes on white then blends in, $30)  Cerave 100% Mineral Sunscreen SPF 50 Face (good for sensitive skin, $15)  La Roche Posay Anthelios Mineral Zinc Oxide Sunscreen SPF 50 ($35)  La Roche Posay Anthelios Mineral Tinted Sunscreen for Face SPF 50 ($35)  Think Sport Sunscreen (great for sports, though has more of a white cast, $20)  Think Baby Sunscreen (for kids, $21)  Color Science Sunforgettable Total Protection Brush On Shield SPF 50 (Multiple tints, $130)    Chemical Sunscreens  Yola Balderrama Weightless Protection SPF 30 ($48)  Sharon SPF Brightening Moisturizer ($30)  Urban Skin Complexion Protection Moisturizer SPF 30 ($20)  Total Defense + Repair Broad Spectrum SPF 34 ($68)  Clarins UV PLUS Anti-Pollution Sunscreen Multi-Protection Tint SPF 50 (Multiple tints, $45)  Neutrogena Healthy Skin Glow Sheers Tinted Moisturizer with SPF 20 (Multiple tints, $11)

## 2025-05-12 NOTE — LETTER
5/12/2025       RE: Franky Koehler  [address] [facility] [address]     Dear Colleague,    Thank you for referring your patient, Franky Koehler, to the [facility] [place] [facility]. Please see a copy of my visit note below.    [facility] Dermatology Note  Encounter Date: May 12, 2025  Office Visit     Dermatology Problem List:  # FBSE 05/12/25   - benign findings: seborrheic keratosis, fibrous papule, cherry angiomas  # AK  - LN2 in past   # History of living in [place]   ____________________________________________    Assessment & Plan:    # FBSE 05/12/25   - benign findings: SK, fibrous papule, cherry angiomas  The nature of sun-induced photo-aging and skin cancers is discussed.  Sun avoidance, protective clothing, and the use of 30-SPF sunscreens is advised. Observe closely for skin damage/changes, and call if such occurs.   - reassured patient regarding the benign nature of these lesions.  - reviewed sun protection measures (i.e. protective clothing and broad spectrum sunscreen).   - ABCDEs of melanoma reviewed      Follow-up: 1 year(s) in-person, or earlier for new or changing lesions    Staff and Resident:   Dr. Corcoran and Annette Barksdale, DO   I, Tessa Corcoran MD, saw this patient with the resident and agree with the resident s findings and plan of care as documented in the resident s note.    ____________________________________________    CC: Skin Check (FBSE- No areas of concern. )    HPI:  Mr. Franky Koehler is a(n) 74 year old male who presents today as a return patient for skin cancer screening.     - denies any lesions of concern    - uses sunscreen and wears sun protective clothing: yes, sunscreen - just started using recently    - tanning bed use: NO     - history of sunburns: in the past, nothing recently    - history of skin cancer: no  - family history of melanoma: no     Patient  is otherwise feeling well, without additional skin concerns.    Labs:  None reviewed.    Physical Exam:  Vitals: There were no vitals taken for this visit.  SKIN: Full skin, which includes the head/face, both arms, chest, back, abdomen,both legs, genitalia and/or groin buttocks, digits and/or nails, was examined.    - Numerous waxy, stuck-on, flesh-colored to brown plaques scattered across the back and chest   - 4 mm homogenously blue macule on caudal back, consistent with dilated blood vessel   - 1-2 cm scaly macules noted on the left dorsal forearm, left ventral thigh  - 5mm tan, fibrous papule appreciated on the nose  - Small red papules scattered to trunk   - No concern features on dermoscopy     Medications:  Current Outpatient Medications   Medication Sig Dispense Refill     atorvastatin (LIPITOR) 10 MG tablet Take 1 tablet (10 mg) by mouth daily. 90 tablet 1     hydroCHLOROthiazide 12.5 MG tablet Take 1 tablet (12.5 mg) by mouth daily. 90 tablet 3     losartan (COZAAR) 50 MG tablet Take 2 tablets (100 mg) by mouth daily. 180 tablet 3     sildenafil (VIAGRA) 50 MG tablet Take 1 tablet (50 mg) by mouth daily as needed. 30 tablet 5     mupirocin (BACTROBAN) 2 % external ointment Apply topically 3 times daily 22 g 0     No current facility-administered medications for this visit.      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      Again, thank you for allowing me to participate in the care of your patient.      Sincerely,    Tessa Corcoran MD

## 2025-05-12 NOTE — NURSING NOTE
Dermatology Rooming Note    Franky Koehler's goals for this visit include:   Chief Complaint   Patient presents with    Skin Check     FBSE- No areas of concern.      Constantino Diamond, EMT  Clinic Support  [facility]     [phone]    Employed by [facility]

## 2025-05-13 ENCOUNTER — OFFICE VISIT (OUTPATIENT)
Dept: OPTOMETRY | Facility: CLINIC | Age: 75
End: 2025-05-13
Payer: COMMERCIAL

## 2025-05-13 DIAGNOSIS — H52.223 REGULAR ASTIGMATISM OF BOTH EYES: ICD-10-CM

## 2025-05-13 DIAGNOSIS — H52.4 PRESBYOPIA: ICD-10-CM

## 2025-05-13 DIAGNOSIS — Z96.1 PSEUDOPHAKIA OF BOTH EYES: ICD-10-CM

## 2025-05-13 DIAGNOSIS — Z01.00 ROUTINE EYE EXAM: Primary | ICD-10-CM

## 2025-05-13 PROCEDURE — 92015 DETERMINE REFRACTIVE STATE: CPT | Performed by: OPTOMETRIST

## 2025-05-13 PROCEDURE — 92004 COMPRE OPH EXAM NEW PT 1/>: CPT | Performed by: OPTOMETRIST

## 2025-05-13 ASSESSMENT — REFRACTION_WEARINGRX
OD_CYLINDER: +1.25
OD_SPHERE: -0.50
OD_ADD: +2.00
OS_CYLINDER: SPHERE
OS_SPHERE: -0.50
OS_ADD: +2.00
SPECS_TYPE: PAL OR BIFOCAL
OD_AXIS: 020

## 2025-05-13 ASSESSMENT — CONF VISUAL FIELD
OD_SUPERIOR_TEMPORAL_RESTRICTION: 0
OS_NORMAL: 1
OS_SUPERIOR_NASAL_RESTRICTION: 0
OD_NORMAL: 1
OD_INFERIOR_TEMPORAL_RESTRICTION: 0
OS_SUPERIOR_TEMPORAL_RESTRICTION: 0
OS_INFERIOR_NASAL_RESTRICTION: 0
METHOD: COUNTING FINGERS
OD_INFERIOR_NASAL_RESTRICTION: 0
OS_INFERIOR_TEMPORAL_RESTRICTION: 0
OD_SUPERIOR_NASAL_RESTRICTION: 0

## 2025-05-13 ASSESSMENT — REFRACTION_MANIFEST
METHOD_AUTOREFRACTION: 1
OS_AXIS: 180
OS_CYLINDER: +0.50
OD_AXIS: 015
OS_AXIS: 011
OS_ADD: +2.00
OD_SPHERE: -0.25
OD_AXIS: 024
OS_SPHERE: -0.50
OS_SPHERE: -1.25
OD_ADD: +2.00
OD_SPHERE: -1.00
OD_CYLINDER: +1.25
OS_CYLINDER: +0.75
OD_CYLINDER: +1.00

## 2025-05-13 ASSESSMENT — KERATOMETRY
OS_K1POWER_DIOPTERS: 43.00
OD_AXISANGLE2_DEGREES: 41
OD_K2POWER_DIOPTERS: 42.50
OD_K1POWER_DIOPTERS: 43.00
OS_AXISANGLE2_DEGREES: 41
OS_K2POWER_DIOPTERS: 42.25

## 2025-05-13 ASSESSMENT — SLIT LAMP EXAM - LIDS
COMMENTS: NORMAL
COMMENTS: NORMAL

## 2025-05-13 ASSESSMENT — VISUAL ACUITY
METHOD: SNELLEN - LINEAR
OD_SC+: -2
OS_SC+: -1
OD_SC: 20/20
OS_SC: 20/40-3
OD_SC: 20/70-1
OS_SC: 20/30

## 2025-05-13 ASSESSMENT — CUP TO DISC RATIO
OS_RATIO: 0.3
OD_RATIO: 0.3

## 2025-05-13 ASSESSMENT — TONOMETRY
IOP_METHOD: APPLANATION
OS_IOP_MMHG: 18
OD_IOP_MMHG: 18

## 2025-05-13 NOTE — LETTER
5/13/2025      Franky Koehler  [address] [facility] [address]      Dear Colleague,    Thank you for referring your patient, Franky Koehler, to the [facility]. Please see a copy of my visit note below.    Chief Complaint   Patient presents with     COMPREHENSIVE EYE EXAM         Last Eye Exam: 5/3/22  Dilated Previously: Yes    What are you currently using to see? Uses readers as needed. Did not bring them today        Distance Vision Acuity: Satisfied with vision    Near Vision Acuity: Satisfied with vision while reading and using computer unaided, uses readers with fine print or low lights. He thinks that they are +1.75's     Eye Comfort: good  Do you use eye drops? : No  Occupation or Hobbies: Retired     [employer]           Medical, surgical and family histories reviewed and updated 5/13/2025.     Multifocal IOL both eyes     OBJECTIVE: See Ophthalmology exam    ASSESSMENT:    ICD-10-CM    1. Routine eye exam  Z01.00       2. Regular astigmatism of both eyes  H52.223       3. Presbyopia  H52.4       4. Pseudophakia of both eyes  Z96.1           PLAN:     Patient Instructions   1.75 Reading glasses advised  Return in 1 year for eye exam    Linnette Vaughan, OD  [phone]                  Again, thank you for allowing me to participate in the care of your patient.        Sincerely,        Linnette Vaughan, OD    Electronically signed

## 2025-05-13 NOTE — PROGRESS NOTES
Chief Complaint   Patient presents with    COMPREHENSIVE EYE EXAM         Last Eye Exam: 5/3/22  Dilated Previously: Yes    What are you currently using to see? Uses readers as needed. Did not bring them today        Distance Vision Acuity: Satisfied with vision    Near Vision Acuity: Satisfied with vision while reading and using computer unaided, uses readers with fine print or low lights. He thinks that they are +1.75's     Eye Comfort: good  Do you use eye drops? 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     Multifocal IOL both eyes     OBJECTIVE: See Ophthalmology exam    ASSESSMENT:    ICD-10-CM    1. Routine eye exam  Z01.00       2. Regular astigmatism of both eyes  H52.223       3. Presbyopia  H52.4       4. Pseudophakia of both eyes  Z96.1           PLAN:     Patient Instructions   1.75 Reading glasses advised  Return in 1 year for eye exam    Linnette Vaughan, OD  [phone]

## 2025-05-13 NOTE — PATIENT INSTRUCTIONS
1.75 Reading glasses advised  Return in 1 year for eye exam    Linnette Vaughan, OD  [phone]

## 2025-05-21 ENCOUNTER — TELEPHONE (OUTPATIENT)
Dept: DERMATOLOGY | Facility: CLINIC | Age: 75
End: 2025-05-21
Payer: COMMERCIAL

## 2025-05-21 NOTE — TELEPHONE ENCOUNTER
5/21 Left Voicemail (1st Attempt) and Sent Mychart (1st Attempt) for the patient to call back and schedule the following:    Appointment type: Return Dermatology  Provider: Jewell  Return date: 5/12/26  Specialty phone number: [phone]  Additional appointment(s) needed: na  Additonal Notes: na

## (undated) RX ORDER — LIDOCAINE HYDROCHLORIDE 10 MG/ML
INJECTION, SOLUTION EPIDURAL; INFILTRATION; INTRACAUDAL; PERINEURAL
Status: DISPENSED
Start: 2024-12-18

## (undated) RX ORDER — GENTAMICIN 40 MG/ML
INJECTION, SOLUTION INTRAMUSCULAR; INTRAVENOUS
Status: DISPENSED
Start: 2023-04-05

## (undated) RX ORDER — FENTANYL CITRATE 50 UG/ML
INJECTION, SOLUTION INTRAMUSCULAR; INTRAVENOUS
Status: DISPENSED
Start: 2018-11-12

## (undated) RX ORDER — LIDOCAINE HYDROCHLORIDE 20 MG/ML
JELLY TOPICAL
Status: DISPENSED
Start: 2020-10-13

## (undated) RX ORDER — LIDOCAINE HYDROCHLORIDE 10 MG/ML
INJECTION, SOLUTION EPIDURAL; INFILTRATION; INTRACAUDAL; PERINEURAL
Status: DISPENSED
Start: 2023-04-05

## (undated) RX ORDER — GENTAMICIN 40 MG/ML
INJECTION, SOLUTION INTRAMUSCULAR; INTRAVENOUS
Status: DISPENSED
Start: 2024-12-18